# Patient Record
Sex: FEMALE | Race: WHITE | NOT HISPANIC OR LATINO | Employment: UNEMPLOYED | URBAN - METROPOLITAN AREA
[De-identification: names, ages, dates, MRNs, and addresses within clinical notes are randomized per-mention and may not be internally consistent; named-entity substitution may affect disease eponyms.]

---

## 2018-05-15 PROBLEM — C50.919 BREAST CA: Status: ACTIVE | Noted: 2018-05-15

## 2018-05-15 PROBLEM — E87.6 HYPOKALEMIA: Status: ACTIVE | Noted: 2018-05-15

## 2018-05-15 PROBLEM — R06.02 SHORTNESS OF BREATH: Status: ACTIVE | Noted: 2018-05-15

## 2018-05-15 PROBLEM — F41.9 ANXIETY: Status: ACTIVE | Noted: 2018-05-15

## 2018-05-15 PROBLEM — I26.99 PULMONARY EMBOLUS: Status: ACTIVE | Noted: 2018-05-15

## 2018-05-15 PROBLEM — J45.909 ASTHMA: Status: ACTIVE | Noted: 2018-05-15

## 2018-05-16 PROBLEM — E87.6 HYPOKALEMIA: Status: ACTIVE | Noted: 2018-05-16

## 2018-05-16 PROBLEM — C50.919 BREAST CA: Status: ACTIVE | Noted: 2018-05-16

## 2018-05-16 PROBLEM — J45.909 ASTHMA: Status: ACTIVE | Noted: 2018-05-16

## 2018-05-27 PROBLEM — F41.9 ANXIETY: Status: ACTIVE | Noted: 2018-05-27

## 2018-07-11 PROBLEM — L03.313 CELLULITIS OF CHEST WALL: Status: ACTIVE | Noted: 2018-07-11

## 2018-11-14 ENCOUNTER — HOSPITAL ENCOUNTER (INPATIENT)
Facility: HOSPITAL | Age: 43
LOS: 4 days | Discharge: HOME | DRG: 885 | End: 2018-11-18
Attending: PSYCHIATRY & NEUROLOGY | Admitting: PSYCHIATRY & NEUROLOGY
Payer: MEDICAID

## 2018-11-14 ENCOUNTER — HOSPITAL ENCOUNTER (EMERGENCY)
Facility: HOSPITAL | Age: 43
Discharge: PSYCHIATRIC OR SUBSTANCE USE FACILITY | End: 2018-11-14
Attending: SURGERY
Payer: MEDICAID

## 2018-11-14 VITALS
WEIGHT: 146 LBS | BODY MASS INDEX: 28.51 KG/M2 | SYSTOLIC BLOOD PRESSURE: 130 MMHG | TEMPERATURE: 97 F | RESPIRATION RATE: 17 BRPM | DIASTOLIC BLOOD PRESSURE: 90 MMHG | HEART RATE: 90 BPM | OXYGEN SATURATION: 100 %

## 2018-11-14 DIAGNOSIS — Z86.711 HISTORY OF PULMONARY EMBOLUS (PE): ICD-10-CM

## 2018-11-14 DIAGNOSIS — F41.8 DEPRESSION WITH ANXIETY: ICD-10-CM

## 2018-11-14 DIAGNOSIS — R45.851 DEPRESSION WITH SUICIDAL IDEATION: Primary | ICD-10-CM

## 2018-11-14 DIAGNOSIS — F43.10 PTSD (POST-TRAUMATIC STRESS DISORDER): Primary | ICD-10-CM

## 2018-11-14 DIAGNOSIS — F32.A DEPRESSION WITH SUICIDAL IDEATION: Primary | ICD-10-CM

## 2018-11-14 DIAGNOSIS — F33.2 SEVERE EPISODE OF RECURRENT MAJOR DEPRESSIVE DISORDER, WITHOUT PSYCHOTIC FEATURES: ICD-10-CM

## 2018-11-14 LAB
ALBUMIN SERPL BCP-MCNC: 4.5 G/DL
ALP SERPL-CCNC: 74 U/L
ALT SERPL W/O P-5'-P-CCNC: 25 U/L
AMPHET+METHAMPHET UR QL: NEGATIVE
ANION GAP SERPL CALC-SCNC: 8 MMOL/L
APAP SERPL-MCNC: <3 UG/ML
AST SERPL-CCNC: 23 U/L
B-HCG UR QL: NEGATIVE
BARBITURATES UR QL SCN>200 NG/ML: NEGATIVE
BASOPHILS # BLD AUTO: 0.04 K/UL
BASOPHILS NFR BLD: 1 %
BENZODIAZ UR QL SCN>200 NG/ML: NORMAL
BILIRUB SERPL-MCNC: 1.8 MG/DL
BILIRUB UR QL STRIP: NEGATIVE
BUN SERPL-MCNC: 9 MG/DL
BZE UR QL SCN: NEGATIVE
CALCIUM SERPL-MCNC: 9.7 MG/DL
CANNABINOIDS UR QL SCN: NEGATIVE
CHLORIDE SERPL-SCNC: 106 MMOL/L
CLARITY UR: CLEAR
CO2 SERPL-SCNC: 28 MMOL/L
COLOR UR: YELLOW
CREAT SERPL-MCNC: 0.7 MG/DL
CREAT UR-MCNC: 229.5 MG/DL
DIFFERENTIAL METHOD: NORMAL
EOSINOPHIL # BLD AUTO: 0 K/UL
EOSINOPHIL NFR BLD: 1 %
ERYTHROCYTE [DISTWIDTH] IN BLOOD BY AUTOMATED COUNT: 13.4 %
EST. GFR  (AFRICAN AMERICAN): >60 ML/MIN/1.73 M^2
EST. GFR  (NON AFRICAN AMERICAN): >60 ML/MIN/1.73 M^2
ETHANOL SERPL-MCNC: <10 MG/DL
GLUCOSE SERPL-MCNC: 94 MG/DL
GLUCOSE UR QL STRIP: NEGATIVE
HCT VFR BLD AUTO: 44.8 %
HGB BLD-MCNC: 15.3 G/DL
HGB UR QL STRIP: ABNORMAL
KETONES UR QL STRIP: NEGATIVE
LEUKOCYTE ESTERASE UR QL STRIP: NEGATIVE
LYMPHOCYTES # BLD AUTO: 1.1 K/UL
LYMPHOCYTES NFR BLD: 28 %
MCH RBC QN AUTO: 30.4 PG
MCHC RBC AUTO-ENTMCNC: 34.2 G/DL
MCV RBC AUTO: 89 FL
METHADONE UR QL SCN>300 NG/ML: NEGATIVE
MONOCYTES # BLD AUTO: 0.3 K/UL
MONOCYTES NFR BLD: 8.6 %
NEUTROPHILS # BLD AUTO: 2.4 K/UL
NEUTROPHILS NFR BLD: 61.4 %
NITRITE UR QL STRIP: NEGATIVE
OPIATES UR QL SCN: NEGATIVE
PCP UR QL SCN>25 NG/ML: NEGATIVE
PH UR STRIP: 6 [PH] (ref 5–8)
PLATELET # BLD AUTO: 295 K/UL
PMV BLD AUTO: 10.2 FL
POTASSIUM SERPL-SCNC: 3.3 MMOL/L
PROT SERPL-MCNC: 7.4 G/DL
PROT UR QL STRIP: NEGATIVE
RBC # BLD AUTO: 5.04 M/UL
SALICYLATES SERPL-MCNC: <5 MG/DL
SODIUM SERPL-SCNC: 142 MMOL/L
SP GR UR STRIP: 1.02 (ref 1–1.03)
T4 FREE SERPL-MCNC: 1.05 NG/DL
TOXICOLOGY INFORMATION: NORMAL
TSH SERPL DL<=0.005 MIU/L-ACNC: 1.67 UIU/ML
URN SPEC COLLECT METH UR: ABNORMAL
UROBILINOGEN UR STRIP-ACNC: NEGATIVE EU/DL
WBC # BLD AUTO: 3.97 K/UL

## 2018-11-14 PROCEDURE — 99285 EMERGENCY DEPT VISIT HI MDM: CPT

## 2018-11-14 PROCEDURE — 81025 URINE PREGNANCY TEST: CPT

## 2018-11-14 PROCEDURE — 80053 COMPREHEN METABOLIC PANEL: CPT

## 2018-11-14 PROCEDURE — 82746 ASSAY OF FOLIC ACID SERUM: CPT

## 2018-11-14 PROCEDURE — 25000003 PHARM REV CODE 250: Performed by: PSYCHIATRY & NEUROLOGY

## 2018-11-14 PROCEDURE — 80307 DRUG TEST PRSMV CHEM ANLYZR: CPT

## 2018-11-14 PROCEDURE — 11400000 HC PSYCH PRIVATE ROOM

## 2018-11-14 PROCEDURE — 83036 HEMOGLOBIN GLYCOSYLATED A1C: CPT

## 2018-11-14 PROCEDURE — 80320 DRUG SCREEN QUANTALCOHOLS: CPT

## 2018-11-14 PROCEDURE — 82607 VITAMIN B-12: CPT

## 2018-11-14 PROCEDURE — 82306 VITAMIN D 25 HYDROXY: CPT

## 2018-11-14 PROCEDURE — 80061 LIPID PANEL: CPT

## 2018-11-14 PROCEDURE — 84439 ASSAY OF FREE THYROXINE: CPT

## 2018-11-14 PROCEDURE — 84481 FREE ASSAY (FT-3): CPT

## 2018-11-14 PROCEDURE — 84443 ASSAY THYROID STIM HORMONE: CPT

## 2018-11-14 PROCEDURE — 81003 URINALYSIS AUTO W/O SCOPE: CPT | Mod: 59

## 2018-11-14 PROCEDURE — 80329 ANALGESICS NON-OPIOID 1 OR 2: CPT

## 2018-11-14 PROCEDURE — 85025 COMPLETE CBC W/AUTO DIFF WBC: CPT

## 2018-11-14 PROCEDURE — 36415 COLL VENOUS BLD VENIPUNCTURE: CPT

## 2018-11-14 PROCEDURE — 63600175 PHARM REV CODE 636 W HCPCS: Performed by: PSYCHIATRY & NEUROLOGY

## 2018-11-14 RX ORDER — ALBUTEROL SULFATE 90 UG/1
2 AEROSOL, METERED RESPIRATORY (INHALATION) EVERY 6 HOURS PRN
Status: DISCONTINUED | OUTPATIENT
Start: 2018-11-14 | End: 2018-11-18 | Stop reason: HOSPADM

## 2018-11-14 RX ORDER — IBUPROFEN 200 MG
1 TABLET ORAL
Status: DISCONTINUED | OUTPATIENT
Start: 2018-11-14 | End: 2018-11-14 | Stop reason: HOSPADM

## 2018-11-14 RX ORDER — OLANZAPINE 10 MG/2ML
10 INJECTION, POWDER, FOR SOLUTION INTRAMUSCULAR EVERY 4 HOURS PRN
Status: DISCONTINUED | OUTPATIENT
Start: 2018-11-14 | End: 2018-11-18 | Stop reason: HOSPADM

## 2018-11-14 RX ORDER — ENOXAPARIN SODIUM 100 MG/ML
100 INJECTION SUBCUTANEOUS NIGHTLY
Status: DISCONTINUED | OUTPATIENT
Start: 2018-11-14 | End: 2018-11-15

## 2018-11-14 RX ORDER — LOPERAMIDE HYDROCHLORIDE 2 MG/1
2 CAPSULE ORAL
Status: DISCONTINUED | OUTPATIENT
Start: 2018-11-14 | End: 2018-11-18 | Stop reason: HOSPADM

## 2018-11-14 RX ORDER — ENOXAPARIN SODIUM 100 MG/ML
70 INJECTION SUBCUTANEOUS EVERY 12 HOURS
Status: DISCONTINUED | OUTPATIENT
Start: 2018-11-14 | End: 2018-11-14

## 2018-11-14 RX ORDER — OLANZAPINE 10 MG/1
10 TABLET ORAL EVERY 4 HOURS PRN
Status: DISCONTINUED | OUTPATIENT
Start: 2018-11-14 | End: 2018-11-18 | Stop reason: HOSPADM

## 2018-11-14 RX ORDER — HALOPERIDOL 5 MG/ML
5 INJECTION INTRAMUSCULAR EVERY 4 HOURS PRN
Status: DISCONTINUED | OUTPATIENT
Start: 2018-11-14 | End: 2018-11-14 | Stop reason: HOSPADM

## 2018-11-14 RX ORDER — LORAZEPAM 2 MG/ML
2 INJECTION INTRAMUSCULAR EVERY 4 HOURS PRN
Status: DISCONTINUED | OUTPATIENT
Start: 2018-11-14 | End: 2018-11-14 | Stop reason: HOSPADM

## 2018-11-14 RX ORDER — ACETAMINOPHEN 325 MG/1
650 TABLET ORAL EVERY 6 HOURS PRN
Status: DISCONTINUED | OUTPATIENT
Start: 2018-11-14 | End: 2018-11-18 | Stop reason: HOSPADM

## 2018-11-14 RX ORDER — FOLIC ACID 1 MG/1
1 TABLET ORAL DAILY
Status: DISCONTINUED | OUTPATIENT
Start: 2018-11-15 | End: 2018-11-18 | Stop reason: HOSPADM

## 2018-11-14 RX ORDER — DIPHENHYDRAMINE HYDROCHLORIDE 50 MG/ML
50 INJECTION INTRAMUSCULAR; INTRAVENOUS EVERY 4 HOURS PRN
Status: DISCONTINUED | OUTPATIENT
Start: 2018-11-14 | End: 2018-11-14 | Stop reason: HOSPADM

## 2018-11-14 RX ORDER — CLONAZEPAM 1 MG/1
1 TABLET ORAL 2 TIMES DAILY
Status: DISCONTINUED | OUTPATIENT
Start: 2018-11-14 | End: 2018-11-16

## 2018-11-14 RX ORDER — MAG HYDROX/ALUMINUM HYD/SIMETH 200-200-20
30 SUSPENSION, ORAL (FINAL DOSE FORM) ORAL EVERY 6 HOURS PRN
Status: DISCONTINUED | OUTPATIENT
Start: 2018-11-14 | End: 2018-11-18 | Stop reason: HOSPADM

## 2018-11-14 RX ORDER — DOCUSATE SODIUM 100 MG/1
100 CAPSULE, LIQUID FILLED ORAL DAILY PRN
Status: DISCONTINUED | OUTPATIENT
Start: 2018-11-14 | End: 2018-11-18 | Stop reason: HOSPADM

## 2018-11-14 RX ORDER — HYDROXYZINE PAMOATE 50 MG/1
50 CAPSULE ORAL EVERY 6 HOURS PRN
Status: DISCONTINUED | OUTPATIENT
Start: 2018-11-14 | End: 2018-11-18 | Stop reason: HOSPADM

## 2018-11-14 RX ADMIN — ENOXAPARIN SODIUM 100 MG: 100 INJECTION, SOLUTION INTRAVENOUS; SUBCUTANEOUS at 08:11

## 2018-11-14 RX ADMIN — CLONAZEPAM 1 MG: 1 TABLET ORAL at 08:11

## 2018-11-14 RX ADMIN — HYDROXYZINE PAMOATE 50 MG: 50 CAPSULE ORAL at 11:11

## 2018-11-14 NOTE — ED PROVIDER NOTES
[facility]                                                 Chief Complaint  43 y.o. female with Depression    History of Present Illness  Val Wing presents to the emergency room with depression issues today  Patient was sent from a local health clinic for admission to the [facility] this p.m.  Patient has significant PTSD issues with significant anxiety and depression today  Patient is not overtly suicidal but incredibly depressed, safety issues are concern  Patient is not psychotic, not hallucinating, she denies any drug addiction on interview    The history is provided by the patient   device was not used during this ER visit  Medical history: Anxiety, abnormal Pap smear, stage III breast cancer, neuropathy  Surgeries: Breast surgery, , fallopian tube, lymphadenectomy, lysis of adhesions, mastectomy, port placement  No Known Allergies     Review of Systems and Physical Exam      Review of Systems  -- Constitution - no fever, denies fatigue, no weakness, no chills  -- Eyes - no tearing or redness, no visual disturbance  -- Ear, Nose - no tinnitus or earache, no nasal congestion or discharge  -- Mouth,Throat - no sore throat, no toothache, normal voice, normal swallowing  -- Respiratory - denies cough and congestion, no shortness of breath, no COLUNGA  -- Cardiovascular - denies chest pain, no palpitations, denies claudication  -- Gastrointestinal - denies abdominal pain, nausea, vomiting, or diarrhea  -- Genitourinary - no dysuria, denies flank pain, no hematuria, no STD risk  -- Musculoskeletal - denies back pain, negative for myalgias and arthralgias   -- Neurological - no headache, denies weakness or seizure; no LOC  -- Skin - denies pallor, rash, or changes in skin. no hives or welts noted  -- Psychiatric - PTSD and depression, no psychosis or fractured thought noted     Physical Exam  -- Nursing note and vitals reviewed  -- Constitutional: Appears  well-developed and well-nourished  -- Head: Atraumatic. Normocephalic. No obvious abnormality  -- Eyes: Pupils are equal and reactive to light. Normal conjunctiva and lids  -- Cardiac: Normal rate, regular rhythm and normal heart sounds  -- Pulmonary: Normal respiratory effort, breath sounds clear to auscultation  -- Abdominal: Soft, no tenderness. Normal bowel sounds. Normal liver edge  -- Musculoskeletal: Normal range of motion, no effusions. Joints stable   -- Neurological: No focal deficits. Showed good interaction with staff  -- Vascular: Posterior tibial, dorsalis pedis and radial pulses 2+ bilaterally    -- Lymphatics: No cervical or peripheral lymphadenopathy. No edema noted  -- Skin: Warm and dry. No evidence of rash or cellulitis    Emergency Room Course      Diagnosis  -- The primary encounter diagnosis was PTSD (post-traumatic stress disorder).   -- A diagnosis of Depression with anxiety was also pertinent to this visit.    Disposition and Plan  -- Disposition: PEC  -- Condition: stable  -- Pt will be placed in a psychiatric facility  -- The patient is a direct observation until placement  -- The patient has been made aware of his or her rights while under PEC in the ER  -- All questions have been answered; will follow ER protocols until placement    Lab work was performed in the ER, this patient is cleared for psychiatric placement     This note is dictated on Dragon Natural Speaking word recognition program.  There are word recognition mistakes that are occasionally missed on review.          Shahid Gordon MD  11/14/18 [id number]

## 2018-11-15 PROBLEM — F33.2 SEVERE EPISODE OF RECURRENT MAJOR DEPRESSIVE DISORDER, WITHOUT PSYCHOTIC FEATURES: Status: ACTIVE | Noted: 2018-11-15

## 2018-11-15 PROBLEM — F43.10 PTSD (POST-TRAUMATIC STRESS DISORDER): Status: ACTIVE | Noted: 2018-11-15

## 2018-11-15 LAB
25(OH)D3+25(OH)D2 SERPL-MCNC: 34 NG/ML
CHOLEST SERPL-MCNC: 172 MG/DL
CHOLEST/HDLC SERPL: 2.6 {RATIO}
ESTIMATED AVG GLUCOSE: 80 MG/DL
FOLATE SERPL-MCNC: 13.7 NG/ML
HBA1C MFR BLD HPLC: 4.4 %
HDLC SERPL-MCNC: 67 MG/DL
HDLC SERPL: 39 %
LDLC SERPL CALC-MCNC: 85.8 MG/DL
NONHDLC SERPL-MCNC: 105 MG/DL
T3FREE SERPL-MCNC: 2.9 PG/ML
TRIGL SERPL-MCNC: 96 MG/DL
VIT B12 SERPL-MCNC: 581 PG/ML

## 2018-11-15 PROCEDURE — 11400000 HC PSYCH PRIVATE ROOM

## 2018-11-15 PROCEDURE — 99223 1ST HOSP IP/OBS HIGH 75: CPT | Mod: ,,, | Performed by: PSYCHIATRY & NEUROLOGY

## 2018-11-15 PROCEDURE — 25000003 PHARM REV CODE 250: Performed by: PSYCHIATRY & NEUROLOGY

## 2018-11-15 PROCEDURE — 36415 COLL VENOUS BLD VENIPUNCTURE: CPT

## 2018-11-15 PROCEDURE — 90833 PSYTX W PT W E/M 30 MIN: CPT | Mod: ,,, | Performed by: PSYCHIATRY & NEUROLOGY

## 2018-11-15 PROCEDURE — 99232 SBSQ HOSP IP/OBS MODERATE 35: CPT | Mod: ,,, | Performed by: NURSE PRACTITIONER

## 2018-11-15 PROCEDURE — 63600175 PHARM REV CODE 636 W HCPCS: Performed by: NURSE PRACTITIONER

## 2018-11-15 PROCEDURE — 97802 MEDICAL NUTRITION INDIV IN: CPT

## 2018-11-15 RX ORDER — PAROXETINE 10 MG/1
10 TABLET, FILM COATED ORAL DAILY
Status: DISCONTINUED | OUTPATIENT
Start: 2018-11-15 | End: 2018-11-16

## 2018-11-15 RX ORDER — ENOXAPARIN SODIUM 100 MG/ML
40 INJECTION SUBCUTANEOUS NIGHTLY
Status: DISCONTINUED | OUTPATIENT
Start: 2018-11-15 | End: 2018-11-18 | Stop reason: HOSPADM

## 2018-11-15 RX ORDER — MIRTAZAPINE 15 MG/1
15 TABLET, ORALLY DISINTEGRATING ORAL NIGHTLY
Status: DISCONTINUED | OUTPATIENT
Start: 2018-11-15 | End: 2018-11-18 | Stop reason: HOSPADM

## 2018-11-15 RX ORDER — PAROXETINE 10 MG/1
10 TABLET, FILM COATED ORAL DAILY
Status: DISCONTINUED | OUTPATIENT
Start: 2018-11-15 | End: 2018-11-15

## 2018-11-15 RX ADMIN — CLONAZEPAM 1 MG: 1 TABLET ORAL at 08:11

## 2018-11-15 RX ADMIN — FOLIC ACID 1 MG: 1 TABLET ORAL at 08:11

## 2018-11-15 RX ADMIN — MIRTAZAPINE 15 MG: 15 TABLET, ORALLY DISINTEGRATING ORAL at 08:11

## 2018-11-15 RX ADMIN — PAROXETINE 10 MG: 10 TABLET, FILM COATED ORAL at 12:11

## 2018-11-15 RX ADMIN — THERA TABS 1 TABLET: TAB at 08:11

## 2018-11-15 RX ADMIN — ENOXAPARIN SODIUM 40 MG: 100 INJECTION SUBCUTANEOUS at 08:11
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
## 2018-11-15 NOTE — PROGRESS NOTES
" [facility]  Adult Nutrition  Progress Note    SUMMARY       Recommendations    Recommendation/Intervention: (P) Recommend pt to continue and tolerate regular diet   Goals: (P) Pt to increase PO intake to 50% while in the hospital   Nutrition Goal Status: (P) new    Reason for Assessment    Reason for Assessment: (P) consult  Diagnosis: (P) (Depression with suicidal ideation )  Relevant Medical History: (P) Breast cancer stage III hx  General Information Comments: (P) NFPE not completed; not appropriate due to pt psych status   Nutrition Discharge Planning: (P) Pt to continue on regular diet and take multivitamin at home.     Nutrition Risk Screen    Nutrition Risk Screen: no indicators present    Nutrition/Diet History    Patient Reported Diet/Restrictions/Preferences: (P) general  Typical Food/Fluid Intake: (P) 25% breakfast  Do you have any cultural, spiritual, Latter day conflicts, given your current situation?: none  Food Allergies: (P) NKFA  Factors Affecting Nutritional Intake: (P) None identified at this time    Anthropometrics    Temp: 96.6 °F (35.9 °C)  Height Method: Measured  Height: (P) 5' 1" (154.9 cm)  Height (inches): (P) 61 in  Weight Method: Standard Scale  Weight: (P) 65.8 kg (145 lb 1 oz)  Weight (lb): (P) 145.06 lb  Ideal Body Weight (IBW), Female: (P) 105 lb  % Ideal Body Weight, Female (lb): (P) 138.15 lb  BMI (Calculated): (P) 27.5  BMI Grade: (P) 25 - 29.9 - overweight       Lab/Procedures/Meds    Pertinent Labs Reviewed: (P) reviewed  Pertinent Labs Comments: (P) Potassium 3.3  Pertinent Medications Reviewed: (P) reviewed  Pertinent Medications Comments: (P) Multivitamin, Folic acid     Physical Findings/Assessment    Overall Physical Appearance: (P) nourished  Oral/Mouth Cavity: (P) no grinding or difficulty chewing  Skin: (P) intact    Estimated/Assessed Needs    Weight Used For Calorie Calculations: (P) 65.8 kg (145 lb 1 oz)  Energy Calorie Requirements (kcal): (P) " 1506(1.2 AF)  Energy Need Method: (P) Shelly Church  Protein Requirements: (P) 52-65.94(0.8-1gm protein)  Weight Used For Protein Calculations: (P) 65.8 kg (145 lb 1 oz)  Fluid Requirements (mL): (P) 1506  Fluid Need Method: (P) RDA Method  RDA Method (mL): (P) 1506         Nutrition Prescription Ordered    Current Diet Order: (P) Regular    Evaluation of Received Nutrient/Fluid Intake    % Kcal Needs: (P) 25%  % Protein Needs: (P) 25%  Tolerance: (P) tolerating  % Intake of Estimated Energy Needs: 25 - 50 %  % Meal Intake: 25 - 50 %    Nutrition Risk    Level of Risk/Frequency of Follow-up: (P) low     Assessment and Plan  Nutrition Problem  Inadequate oral intake     Related to (etiology):   Pt not eating all breakfast     Signs and Symptoms (as evidenced by):   Pt PO intake at 25%    Interventions  General/healthful diet  Multivitamin/mineral supplementation therpay    Recommendations (treatment strategy):  Recommend pt to continue and tolerate regular diet     Nutrition Diagnosis Status:   New       Monitor and Evaluation    Food and Nutrient Intake: (P) food and beverage intake  Food and Nutrient Adminstration: (P) diet order  Knowledge/Beliefs/Attitudes: (P) beliefs and attitudes, food and nutrition knowledge/skill  Physical Activity and Function: (P) nutrition-related ADLs and IADLs  Anthropometric Measurements: (P) body mass index, weight change, weight, height/length  Biochemical Data, Medical Tests and Procedures: (P) lipid profile, inflammatory profile, gastrointestinal profile, glucose/endocrine profile, electrolyte and renal panel  Nutrition-Focused Physical Findings: (P) overall appearance     Nutrition Follow-Up    RD Follow-up?: (P) Yes

## 2018-11-15 NOTE — PLAN OF CARE
Problem: Patient Care Overview (Adult)  Goal: Plan of Care Review  Outcome: Ongoing (interventions implemented as appropriate)  Pt had trouble falling asleep. PRN Vistaril given and was noted to be lying quiet in bed, eyes closed, respiration even and unlabored, appearing asleep by 0100.  Slept well for remainder of shift.  Safety and precautions maintained with rounds every 15 minutes, bed is fixed in a low position and pathways kept clear.  No fall occurred.

## 2018-11-15 NOTE — CONSULTS
[facility] Medicine  Consult Note    Patient Name: Val Wing  MRN: [id number]  Admission Date: 2018  Hospital Length of Stay: 1 days  Attending Physician: Junior Aiken MD   Primary Care Provider: Milton Zuleta MD           Patient information was obtained from patient and ER records.     Inpatient consult to [facility] for History and Physical  Consult performed by: Shania Styles NP  Consult ordered by: Junior Aiken MD        Subjective:     Principal Problem: <principal problem not specified>    Chief Complaint: No chief complaint on file.       HPI: 42 yo female patient with hx of depression presented to ER with severe depression yesterday. Admitted to U. Medicine consulted for H/P.     Does a recent hx of PE. Was on lovenox. She recently had CT showing resolution of PE. She reports that her pulm has placed her on lovenox 100mg daily. She reports that that was her first clot and is no longer being treated for breast ca. She is unsure why she is still on lovenox at all much less inj vs pills for blood thinner.     Past Medical History:   Diagnosis Date    Abnormal Pap smear of cervix     ASCUS +HPV    Anxiety     Breast CA 2017    stage 3    Depression     Hx of psychiatric care     Neuropathy     Psychiatric problem        Past Surgical History:   Procedure Laterality Date    BREAST SURGERY       SECTION      fallopian tube removal      infusaport      LYMPHADENECTOMY Left     LYSIS-ADHESION N/A 2015    Performed by [facility] at [facility]    MASTECTOMY Left     REPEAT DELIVERY-CEASAREAN SECTION N/A 2015    Performed by [facility] at [facility]       Review of patient's allergies indicates:  No Known Allergies    Current Facility-Administered Medications on File Prior to Encounter   Medication    [DISCONTINUED] diphenhydrAMINE injection 50 mg    [DISCONTINUED] haloperidol lactate  injection 5 mg    [DISCONTINUED] lorazepam injection 2 mg    [DISCONTINUED] nicotine 21 mg/24 hr 1 patch     Current Outpatient Medications on File Prior to Encounter   Medication Sig    enoxaparin (LOVENOX) 80 mg/0.8 mL Syrg Inject 0.7 mLs (70 mg total) into the skin every 12 (twelve) hours.    albuterol 90 mcg/actuation inhaler Inhale 2 puffs into the lungs every 6 (six) hours as needed for Wheezing (COUGH).     Family History     Problem Relation (Age of Onset)    Anxiety disorder Mother        Tobacco Use    Smoking status: Never Smoker    Smokeless tobacco: Never Used   Substance and Sexual Activity    Alcohol use: No    Drug use: No    Sexual activity: Yes     Partners: Male     Birth control/protection: None     Comment:      Review of Systems   Constitutional: Negative for chills, fatigue, fever and unexpected weight change.   HENT: Negative for congestion, ear pain, sore throat and trouble swallowing.    Eyes: Negative for pain and visual disturbance.   Respiratory: Negative for cough, chest tightness and shortness of breath.    Cardiovascular: Negative for chest pain, palpitations and leg swelling.   Gastrointestinal: Negative for abdominal distention, abdominal pain, constipation, diarrhea and vomiting.   Genitourinary: Negative for difficulty urinating, dysuria, flank pain, frequency and hematuria.   Musculoskeletal: Negative for back pain, gait problem, joint swelling, neck pain and neck stiffness.   Skin: Negative for rash and wound.   Neurological: Negative for dizziness, seizures, speech difficulty, light-headedness and headaches.     Objective:     Vital Signs (Most Recent):  Temp: 96.6 °F (35.9 °C) (11/15/18 0802)  Pulse: (!) 53 (11/15/18 0802)  Resp: 16 (11/15/18 0802)  BP: 109/61 (11/15/18 0802) Vital Signs (24h Range):  Temp:  [96.3 °F (35.7 °C)-97 °F (36.1 °C)] 96.6 °F (35.9 °C)  Pulse:  [53-94] 53  Resp:  [16-20] 16  SpO2:  [99 %-100 %] 100 %  BP: (109-131)/(61-90) 109/61      Weight: 65.8 kg (145 lb 1 oz)  Body mass index is 27.41 kg/m².    Physical Exam   Constitutional: She is oriented to person, place, and time. She appears well-developed and well-nourished.   HENT:   Head: Normocephalic and atraumatic.   Neck: No thyromegaly present.   Cardiovascular: Normal rate, regular rhythm, normal heart sounds and intact distal pulses.   No murmur heard.  Pulmonary/Chest: Effort normal and breath sounds normal. No respiratory distress. She has no wheezes. She has no rales.   Abdominal: Soft. Bowel sounds are normal. She exhibits no distension and no mass. There is no tenderness.   Musculoskeletal: Normal range of motion. She exhibits no edema.   Lymphadenopathy:     She has no cervical adenopathy.   Neurological: She is alert and oriented to person, place, and time.     Neuro: Cranial nerves:  CN II Visual fields full to confrontation.   CN III, IV, VI Pupils are equal, round, and reactive to light.  CN III: no palsy  Nystagmus: none   Diplopia: none  Ophthalmoparesis: none  CN V Facial sensation intact.   CN VII Facial expression full, symmetric.   CN VIII normal.   CN IX normal.   CN X normal.   CN XI normal.   CN XII normal.         Skin: Skin is warm and dry. No erythema.   Vitals reviewed.      Significant Labs:  UPT  Results for orders placed or performed during the hospital encounter of 05/09/17   POCT urine pregnancy   Result Value Ref Range    POC Preg Test, Ur Negative Negative     Acceptable Yes      U/A  Results for orders placed or performed during the hospital encounter of 05/09/17   Urinalysis   Result Value Ref Range    Specimen UA Urine, Clean Catch     Color, UA Yellow Yellow, Straw, Huong    Appearance, UA Hazy (A) Clear    pH, UA 8.0 5.0 - 8.0    Specific Gravity, UA 1.005 1.005 - 1.030    Protein, UA Negative Negative    Glucose, UA Negative Negative    Ketones, UA Negative Negative    Bilirubin (UA) Negative Negative    Occult Blood UA 1+ (A) Negative     Nitrite, UA Negative Negative    Urobilinogen, UA Negative <2.0 EU/dL    Leukocytes, UA Negative Negative     UDS  Results for orders placed or performed during the hospital encounter of 11/14/18   Drug screen panel, emergency   Result Value Ref Range    Benzodiazepines Presumptive Positive     Methadone metabolites Negative     Cocaine (Metab.) Negative     Opiate Scrn, Ur Negative     Barbiturate Screen, Ur Negative     Amphetamine Screen, Ur Negative     THC Negative     Phencyclidine Negative     Creatinine, Random Ur 229.5 15.0 - 325.0 mg/dL    Toxicology Information SEE COMMENT      CBC  Results for orders placed or performed during the hospital encounter of 11/14/18   CBC auto differential   Result Value Ref Range    WBC 3.97 3.90 - 12.70 K/uL    RBC 5.04 4.00 - 5.40 M/uL    Hemoglobin 15.3 12.0 - 16.0 g/dL    Hematocrit 44.8 37.0 - 48.5 %    MCV 89 82 - 98 fL    MCH 30.4 27.0 - 31.0 pg    MCHC 34.2 32.0 - 36.0 g/dL    RDW 13.4 11.5 - 14.5 %    Platelets 295 150 - 350 K/uL    MPV 10.2 9.2 - 12.9 fL    Gran # (ANC) 2.4 1.8 - 7.7 K/uL    Lymph # 1.1 1.0 - 4.8 K/uL    Mono # 0.3 0.3 - 1.0 K/uL    Eos # 0.0 0.0 - 0.5 K/uL    Baso # 0.04 0.00 - 0.20 K/uL    Gran% 61.4 38.0 - 73.0 %    Lymph% 28.0 18.0 - 48.0 %    Mono% 8.6 4.0 - 15.0 %    Eosinophil% 1.0 0.0 - 8.0 %    Basophil% 1.0 0.0 - 1.9 %    Differential Method Automated      CMP  Results for orders placed or performed during the hospital encounter of 11/14/18   Comprehensive metabolic panel   Result Value Ref Range    Sodium 142 136 - 145 mmol/L    Potassium 3.3 (L) 3.5 - 5.1 mmol/L    Chloride 106 95 - 110 mmol/L    CO2 28 23 - 29 mmol/L    Glucose 94 70 - 110 mg/dL    BUN, Bld 9 6 - 20 mg/dL    Creatinine 0.7 0.5 - 1.4 mg/dL    Calcium 9.7 8.7 - 10.5 mg/dL    Total Protein 7.4 6.0 - 8.4 g/dL    Albumin 4.5 3.5 - 5.2 g/dL    Total Bilirubin 1.8 (H) 0.1 - 1.0 mg/dL    Alkaline Phosphatase 74 55 - 135 U/L    AST 23 10 - 40 U/L    ALT 25 10 - 44 U/L    Anion  Gap 8 8 - 16 mmol/L    eGFR if African American >60 >60 mL/min/1.73 m^2    eGFR if non African American >60 >60 mL/min/1.73 m^2     TSH  Results for orders placed or performed during the hospital encounter of 11/14/18   TSH   Result Value Ref Range    TSH 1.665 0.400 - 4.000 uIU/mL     ETOH  Results for orders placed or performed during the hospital encounter of 11/14/18   Ethanol   Result Value Ref Range    Alcohol, Medical, Serum <10 <10 mg/dL     Salicylate  Results for orders placed or performed during the hospital encounter of 11/14/18   Salicylate level   Result Value Ref Range    Salicylate Lvl <5.0 (L) 15.0 - 30.0 mg/dL     Acetaminophen  Results for orders placed or performed during the hospital encounter of 11/14/18   Acetaminophen level   Result Value Ref Range    Acetaminophen (Tylenol), Serum <3.0 (L) 10.0 - 20.0 ug/mL       CTA 8/9/17 No CT findings for pulmonary embolism with the emboli seen in the right lung base on the previous study no longer visualized.  Some fibrosis in the left upper lung       Assessment/Plan:     Depression with suicidal ideation    Further orders per psych       Pulmonary embolus    5/15 noted on CTA. Was anticoagulated since then, repeat CTA last month shows resolution, should no longer need OPE treatment. Not sure how she was put on 100mg dose. Even 1mg /kg should be 60 BID. Will reduce to dvt prophylaxis and f/u hematology once d/c to eval if she should cont long term anticoag and if she needs to then can she be transitioned to pill. .          VTE Risk Mitigation (From admission, onward)        Ordered     enoxaparin injection 40 mg  Nightly      11/14/18 1942              Thank you for your consult. I will sign off. Please contact us if you have any additional questions.    Shania Styles NP  Department of Hospital Medicine   [facility]

## 2018-11-15 NOTE — PLAN OF CARE
Problem: Patient Care Overview (Adult)  Goal: Plan of Care Review  Outcome: Ongoing (interventions implemented as appropriate)  Plan of care reviewed.  Denies intent to harm self or others at this time.  Accepts all meals and medications.  Gait steady, no falls.  Pleasant, interacts with staff and peers. Seems a little anxious but is willing to follow the recommendation made by MD and staff. Promoted an individualized safety plan, reality-based interactions, effective coping strategies, and impulse control.  Will continue to monitor for safety.         Problem: Mood Impairment (Depressive Signs/Symptoms) (Adult)  Goal: Improved Mood Symptoms  Outcome: Ongoing (interventions implemented as appropriate)  Accepts med as ordered by MD.

## 2018-11-15 NOTE — PROGRESS NOTES
"   11/15/18 1614   Assessment   Patient's Identification of the Problem Patient presents tearful affect and "feeling tired, legs numb" mood. Patient states her admit is due to anxiety, depression and suicidal thoughts. Patient states she does not want to be on xanax anymore because it's not helping. Patient reports having a history of breast cancer 2017, finiished chemo May 2018, currently in remission.  October 2017 while going through chemotherapy. Patient denies alcohol or drug use. Patient reports she is  twice, have 2 girls ages 2 & 17, some college/currently attending college(medical billing and coding), certified as a physical therapy tech, unemployed(lives off child support and savings), wears glasses, is a [employer], lives in [facility] [place] [facility] with 2 year old daughter(owns her mobile home). Patient verbalized main goal "get off xanax, be on something to manage my anxiety and depression, nothing narcotic."    Leisure Interest Watching TV;Sit Outside;Movies;Enjoy Family/Friends;Mu-ism;Other (See Comments)  (I don't do nothing now, been a few months)   Leisure Barriers Too Busy;Endurance;In Pain;Attitude;Loss of Interest;Lack of Finances;Energy Level;Self Confidence;Fears/Phobias;Other (See Comments)  (fear having a panic attack,get mad easily)   Treatment Focus To Improve Mood;To Increase Motivation;Increase Self Confidence;To Improve Leisure Awareness/Lifestyle/Interest;To Promote Successful and Safe Self Expression;To Improve Coping Skills;To Increase Energy Level     Treatment Recommendation: To address problem(s) #  1:1 Intervention (as needed)    Cognitive Stimulation Skilled Activity  Creative Expression Skilled Activity  Mild Exercises Skilled Activity  Stress Management Skilled Activity  Coping Skilled Activity  Leisure Education and Awareness Skilled Activity    Treatment Goal(s):  Long Term Goals Refer To Master Treatment Plan    Short Term Treatment Goal(s)  Patient Will:  Exhibit " Improvement in Mood  Demonstrate Constructive Expression of Feelings and Behavior  Identify at Least 2 Coping Skills or Leisure Skills to Reduce Depression and Hopelessness Upon Request from Therapist    Discharge Recommendations:  Encourage Patient to Actively Utilize Available Community Resources to Increase Leisure Involvement to Decrease Signs and Symptoms of Illness  Encourage Patient to Utilize Coping Skills on a Regular Basis to Reduce the Risk of Decompensating and Re-Hospitalizations  Follow Up with After Care Appointments  Continue with Current Leisure Activities

## 2018-11-15 NOTE — PLAN OF CARE
Problem: Patient Care Overview (Adult)  Goal: Plan of Care Review  Outcome: Ongoing (interventions implemented as appropriate)  Goals: Pt to increase PO intake to 50% while in the hospital   Nutrition Goal Status: new    Nutrition Discharge Planning: Pt to continue on regular diet and take multivitamin at home.

## 2018-11-15 NOTE — PSYCH
Pt accepted for admission by Dr Aiken.Pt arrived to unit at 1828.Prior to entry on unit pt scanned per security wand.Upon entry on unit pt received a body assessment.Pr admitted for depression and anxiety.Pt went to [facility] today and was referred here.Pt depression and anxiety has become worse recently.Pt unable to function.Unable to hold a job and had to drop her on line courses.Pt with poor appetite and sleep.Pt has been taking extra Xanax at night for sleep.Pt has been having suicidal thoughts but no plans.Pt very depressed and anxious.Pt given unit tour and educated on program and rules.Pt cooperative.

## 2018-11-15 NOTE — PLAN OF CARE
"Problem: Overarching Goals (Adult)  Goal: Develops/Participates in Therapeutic [place] to Support Successful Transition    Intervention: [facility] The[facility] [place]  1:1 with pt:    Individual psycho-educational meeting initiated with patient in lieu of group. Discussed importance of attending counseling to address/process past traumas and mental health needs. Patient provided with [facility] contact information and educated on additional professional resources such as [place] Childcare Assistance Program. Patient expressed she had to "quit her job" due to lack of child chare for her 1 y/o dtr. . Patient verbalized she would complete the online application once she returns home, and it would greatly befit her if she qualified for such services, because she would be able to return to work. Patient continues to be very motivated and expressed her intent to follow through with the above recommendations.  She was encouraged to attend future therapeutic activities .         "

## 2018-11-15 NOTE — HPI
42 yo female patient with hx of depression presented to ER with severe depression yesterday. Admitted to U. Medicine consulted for H/P.     Does a recent hx of PE. Was on lovenox. She recently had CT showing resolution of PE

## 2018-11-15 NOTE — ASSESSMENT & PLAN NOTE
5/15 noted on CTA. Was anticoagulated since then, repeat CTA last month shows resolution, no longer on lovenox.

## 2018-11-15 NOTE — PLAN OF CARE
Problem: Patient Care Overview (Adult)  Goal: Plan of Care Review  Outcome: Ongoing (interventions implemented as appropriate)  Pt is cooperative and pleasant, somewhat anxious and reports depression.  Pt denies any active S/I, reports some passive thoughts at times with no plan or intent.  Pt has a long history of depression and anxiety with panic attacks.  Pt verbally contracts for safety.  Encouraged pt to take all meds as ordered and report any issues to staff.  Pt verbalized understanding, will continue to monitor mood and behavior.

## 2018-11-15 NOTE — SUBJECTIVE & OBJECTIVE
Past Medical History:   Diagnosis Date    Abnormal Pap smear of cervix     ASCUS +HPV    Anxiety     Breast CA 2017    stage 3    Depression     Hx of psychiatric care     Neuropathy     Psychiatric problem        Past Surgical History:   Procedure Laterality Date    BREAST SURGERY       SECTION      fallopian tube removal      infusaport      LYMPHADENECTOMY Left     LYSIS-ADHESION N/A 2015    [facility]    MASTECTOMY Left     REPEAT DELIVERY-CEASAREAN SECTION N/A 2015    Performed by [facility] at [facility]       Review of patient's allergies indicates:  No Known Allergies    Current Facility-Administered Medications on File Prior to Encounter   Medication    [DISCONTINUED] diphenhydrAMINE injection 50 mg    [DISCONTINUED] haloperidol lactate injection 5 mg    [DISCONTINUED] lorazepam injection 2 mg    [DISCONTINUED] nicotine 21 mg/24 hr 1 patch     Current Outpatient Medications on File Prior to Encounter   Medication Sig    enoxaparin (LOVENOX) 80 mg/0.8 mL Syrg Inject 0.7 mLs (70 mg total) into the skin every 12 (twelve) hours.    albuterol 90 mcg/actuation inhaler Inhale 2 puffs into the lungs every 6 (six) hours as needed for Wheezing (COUGH).     Family History     Problem Relation (Age of Onset)    Anxiety disorder Mother        Tobacco Use    Smoking status: Never Smoker    Smokeless tobacco: Never Used   Substance and Sexual Activity    Alcohol use: No    Drug use: No    Sexual activity: Yes     Partners: Male     Birth control/protection: None     Comment:      Review of Systems   Constitutional: Negative for chills, fatigue, fever and unexpected weight change.   HENT: Negative for congestion, ear pain, sore throat and trouble swallowing.    Eyes: Negative for pain and visual disturbance.   Respiratory: Negative for cough, chest tightness and shortness of breath.    Cardiovascular: Negative for chest  pain, palpitations and leg swelling.   Gastrointestinal: Negative for abdominal distention, abdominal pain, constipation, diarrhea and vomiting.   Genitourinary: Negative for difficulty urinating, dysuria, flank pain, frequency and hematuria.   Musculoskeletal: Negative for back pain, gait problem, joint swelling, neck pain and neck stiffness.   Skin: Negative for rash and wound.   Neurological: Negative for dizziness, seizures, speech difficulty, light-headedness and headaches.     Objective:     Vital Signs (Most Recent):  Temp: 96.6 °F (35.9 °C) (11/15/18 0802)  Pulse: (!) 53 (11/15/18 0802)  Resp: 16 (11/15/18 0802)  BP: 109/61 (11/15/18 0802) Vital Signs (24h Range):  Temp:  [96.3 °F (35.7 °C)-97 °F (36.1 °C)] 96.6 °F (35.9 °C)  Pulse:  [53-94] 53  Resp:  [16-20] 16  SpO2:  [99 %-100 %] 100 %  BP: (109-131)/(61-90) 109/61     Weight: 65.8 kg (145 lb 1 oz)  Body mass index is 27.41 kg/m².    Physical Exam   Constitutional: She is oriented to person, place, and time. She appears well-developed and well-nourished.   HENT:   Head: Normocephalic and atraumatic.   Neck: No thyromegaly present.   Cardiovascular: Normal rate, regular rhythm, normal heart sounds and intact distal pulses.   No murmur heard.  Pulmonary/Chest: Effort normal and breath sounds normal. No respiratory distress. She has no wheezes. She has no rales.   Abdominal: Soft. Bowel sounds are normal. She exhibits no distension and no mass. There is no tenderness.   Musculoskeletal: Normal range of motion. She exhibits no edema.   Lymphadenopathy:     She has no cervical adenopathy.   Neurological: She is alert and oriented to person, place, and time.     Neuro: Cranial nerves:  CN II Visual fields full to confrontation.   CN III, IV, VI Pupils are equal, round, and reactive to light.  CN III: no palsy  Nystagmus: none   Diplopia: none  Ophthalmoparesis: none  CN V Facial sensation intact.   CN VII Facial expression full, symmetric.   CN VIII normal.    CN IX normal.   CN X normal.   CN XI normal.   CN XII normal.         Skin: Skin is warm and dry. No erythema.   Vitals reviewed.      Significant Labs:  UPT  Results for orders placed or performed during the hospital encounter of 05/09/17   POCT urine pregnancy   Result Value Ref Range    POC Preg Test, Ur Negative Negative     Acceptable Yes      U/A  Results for orders placed or performed during the hospital encounter of 05/09/17   Urinalysis   Result Value Ref Range    Specimen UA Urine, Clean Catch     Color, UA Yellow Yellow, Straw, Huong    Appearance, UA Hazy (A) Clear    pH, UA 8.0 5.0 - 8.0    Specific Gravity, UA 1.005 1.005 - 1.030    Protein, UA Negative Negative    Glucose, UA Negative Negative    Ketones, UA Negative Negative    Bilirubin (UA) Negative Negative    Occult Blood UA 1+ (A) Negative    Nitrite, UA Negative Negative    Urobilinogen, UA Negative <2.0 EU/dL    Leukocytes, UA Negative Negative     UDS  Results for orders placed or performed during the hospital encounter of 11/14/18   Drug screen panel, emergency   Result Value Ref Range    Benzodiazepines Presumptive Positive     Methadone metabolites Negative     Cocaine (Metab.) Negative     Opiate Scrn, Ur Negative     Barbiturate Screen, Ur Negative     Amphetamine Screen, Ur Negative     THC Negative     Phencyclidine Negative     Creatinine, Random Ur 229.5 15.0 - 325.0 mg/dL    Toxicology Information SEE COMMENT      CBC  Results for orders placed or performed during the hospital encounter of 11/14/18   CBC auto differential   Result Value Ref Range    WBC 3.97 3.90 - 12.70 K/uL    RBC 5.04 4.00 - 5.40 M/uL    Hemoglobin 15.3 12.0 - 16.0 g/dL    Hematocrit 44.8 37.0 - 48.5 %    MCV 89 82 - 98 fL    MCH 30.4 27.0 - 31.0 pg    MCHC 34.2 32.0 - 36.0 g/dL    RDW 13.4 11.5 - 14.5 %    Platelets 295 150 - 350 K/uL    MPV 10.2 9.2 - 12.9 fL    Gran # (ANC) 2.4 1.8 - 7.7 K/uL    Lymph # 1.1 1.0 - 4.8 K/uL    Mono # 0.3 0.3 -  1.0 K/uL    Eos # 0.0 0.0 - 0.5 K/uL    Baso # 0.04 0.00 - 0.20 K/uL    Gran% 61.4 38.0 - 73.0 %    Lymph% 28.0 18.0 - 48.0 %    Mono% 8.6 4.0 - 15.0 %    Eosinophil% 1.0 0.0 - 8.0 %    Basophil% 1.0 0.0 - 1.9 %    Differential Method Automated      CMP  Results for orders placed or performed during the hospital encounter of 11/14/18   Comprehensive metabolic panel   Result Value Ref Range    Sodium 142 136 - 145 mmol/L    Potassium 3.3 (L) 3.5 - 5.1 mmol/L    Chloride 106 95 - 110 mmol/L    CO2 28 23 - 29 mmol/L    Glucose 94 70 - 110 mg/dL    BUN, Bld 9 6 - 20 mg/dL    Creatinine 0.7 0.5 - 1.4 mg/dL    Calcium 9.7 8.7 - 10.5 mg/dL    Total Protein 7.4 6.0 - 8.4 g/dL    Albumin 4.5 3.5 - 5.2 g/dL    Total Bilirubin 1.8 (H) 0.1 - 1.0 mg/dL    Alkaline Phosphatase 74 55 - 135 U/L    AST 23 10 - 40 U/L    ALT 25 10 - 44 U/L    Anion Gap 8 8 - 16 mmol/L    eGFR if African American >60 >60 mL/min/1.73 m^2    eGFR if non African American >60 >60 mL/min/1.73 m^2     TSH  Results for orders placed or performed during the hospital encounter of 11/14/18   TSH   Result Value Ref Range    TSH 1.665 0.400 - 4.000 uIU/mL     ETOH  Results for orders placed or performed during the hospital encounter of 11/14/18   Ethanol   Result Value Ref Range    Alcohol, Medical, Serum <10 <10 mg/dL     Salicylate  Results for orders placed or performed during the hospital encounter of 11/14/18   Salicylate level   Result Value Ref Range    Salicylate Lvl <5.0 (L) 15.0 - 30.0 mg/dL     Acetaminophen  Results for orders placed or performed during the hospital encounter of 11/14/18   Acetaminophen level   Result Value Ref Range    Acetaminophen (Tylenol), Serum <3.0 (L) 10.0 - 20.0 ug/mL       CTA 8/9/17 No CT findings for pulmonary embolism with the emboli seen in the right lung base on the previous study no longer visualized.  Some fibrosis in the left upper lung

## 2018-11-15 NOTE — PROGRESS NOTES
11/15/18 1030   [facility] Group Therapy   Group Name Therapeutic Recreation   Specific Interventions Coping Skills Training   Participation Level Active   Participation Quality Cooperative   Insight/Motivation Applies New Skills;Good   Affect/Mood Display Appropriate   Cognition Alert   Psychomotor WNL   Patient learned new leisure activity, becomes involved, remembers rules and directions.

## 2018-11-15 NOTE — ASSESSMENT & PLAN NOTE
Nutrition Problem  Inadequate oral intake      Related to (etiology):   Pt not eating all breakfast      Signs and Symptoms (as evidenced by):   Pt PO intake at 25%     Interventions  General/healthful diet  Multivitamin/mineral supplementation therpay     Recommendations (treatment strategy):  Recommend pt to continue and tolerate regular diet      Nutrition Diagnosis Status:   New
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
## 2018-11-16 PROCEDURE — 63600175 PHARM REV CODE 636 W HCPCS: Performed by: NURSE PRACTITIONER

## 2018-11-16 PROCEDURE — 25000003 PHARM REV CODE 250: Performed by: PSYCHIATRY & NEUROLOGY

## 2018-11-16 PROCEDURE — 90833 PSYTX W PT W E/M 30 MIN: CPT | Mod: ,,, | Performed by: PSYCHIATRY & NEUROLOGY

## 2018-11-16 PROCEDURE — 11400000 HC PSYCH PRIVATE ROOM

## 2018-11-16 PROCEDURE — 99233 SBSQ HOSP IP/OBS HIGH 50: CPT | Mod: ,,, | Performed by: PSYCHIATRY & NEUROLOGY

## 2018-11-16 RX ORDER — CLONAZEPAM 0.5 MG/1
0.5 TABLET ORAL 2 TIMES DAILY
Status: DISCONTINUED | OUTPATIENT
Start: 2018-11-16 | End: 2018-11-18 | Stop reason: HOSPADM

## 2018-11-16 RX ORDER — DULOXETIN HYDROCHLORIDE 30 MG/1
30 CAPSULE, DELAYED RELEASE ORAL DAILY
Status: DISCONTINUED | OUTPATIENT
Start: 2018-11-16 | End: 2018-11-18 | Stop reason: HOSPADM

## 2018-11-16 RX ADMIN — MIRTAZAPINE 15 MG: 15 TABLET, ORALLY DISINTEGRATING ORAL at 08:11

## 2018-11-16 RX ADMIN — CLONAZEPAM 0.5 MG: 0.5 TABLET ORAL at 08:11

## 2018-11-16 RX ADMIN — FOLIC ACID 1 MG: 1 TABLET ORAL at 08:11

## 2018-11-16 RX ADMIN — THERA TABS 1 TABLET: TAB at 08:11

## 2018-11-16 RX ADMIN — DULOXETINE HYDROCHLORIDE 30 MG: 30 CAPSULE, DELAYED RELEASE ORAL at 08:11

## 2018-11-16 RX ADMIN — ENOXAPARIN SODIUM 40 MG: 100 INJECTION SUBCUTANEOUS at 08:11

## 2018-11-16 NOTE — PLAN OF CARE
Problem: Overarching Goals (Adult)  Goal: Optimized Coping Skills in Response to Life Stressors    Intervention: Promote Effective Coping Strategies  Group Note    Behavior: Patient attended psychotherapy group with uninterested mood, and flat affect. Patient made poor eye contact, and kept her hand on her head.          Intervention: This is your life- Finding balance in our everyday lives .Patients ranked eight categories in order of importance to them and identified where they place too much or too little value and discussed ways to achieve better balance.          Response: Patient participated in the writing exercise, but refused to share feedback.          Plan: Will continue to encourage patient to attend psychotherapy group. Will meet 1:1 with patient later.
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
## 2018-11-16 NOTE — PLAN OF CARE
Problem: Patient Care Overview (Adult)  Goal: Plan of Care Review  Outcome: Ongoing (interventions implemented as appropriate)  Pt is calm and cooperative.  Denies any S/I or H/I at this time.  Mood has improved, pt states she enjoys talking with the other patient's in the dayroom.  Reports sleep was better last night.  Encouraged pt to continue following treatment plan as ordered and to report any issues to staff.  Pt verbalized understanding, will continue to monitor.

## 2018-11-16 NOTE — PLAN OF CARE
Problem: Mood Impairment (Depressive Signs/Symptoms) (Adult)  Goal: Improved Mood Symptoms  Outcome: Ongoing (interventions implemented as appropriate)  Accepting meds as ordered. Rated depression and anxiety  5/10.

## 2018-11-16 NOTE — PLAN OF CARE
Problem: Patient Care Overview (Adult)  Goal: Plan of Care Review  Outcome: Ongoing (interventions implemented as appropriate)  Plan of care reviewed.  Denies intent to harm self or others at this time.  Accepts all meals and medications.  Gait steady, no falls.  Pleasant, interacts with staff and peers. Stays out on the unit in the dayroom.  States she likes talking with everyone.  Also stated that she napped today and she doesn't remember the last time that she was able to do that.  Promoted an individualized safety plan, reality-based interactions, effective coping strategies, and impulse control.  Will continue to monitor for safety.

## 2018-11-17 LAB
BASOPHILS # BLD AUTO: 0.03 K/UL
BASOPHILS NFR BLD: 0.8 %
DIFFERENTIAL METHOD: ABNORMAL
EOSINOPHIL # BLD AUTO: 0 K/UL
EOSINOPHIL NFR BLD: 1.1 %
ERYTHROCYTE [DISTWIDTH] IN BLOOD BY AUTOMATED COUNT: 13.2 %
HCT VFR BLD AUTO: 39.6 %
HGB BLD-MCNC: 13.3 G/DL
LYMPHOCYTES # BLD AUTO: 1.3 K/UL
LYMPHOCYTES NFR BLD: 35.5 %
MCH RBC QN AUTO: 30.2 PG
MCHC RBC AUTO-ENTMCNC: 33.6 G/DL
MCV RBC AUTO: 90 FL
MONOCYTES # BLD AUTO: 0.4 K/UL
MONOCYTES NFR BLD: 10.1 %
NEUTROPHILS # BLD AUTO: 1.9 K/UL
NEUTROPHILS NFR BLD: 52.5 %
PLATELET # BLD AUTO: 245 K/UL
PMV BLD AUTO: 10.6 FL
RBC # BLD AUTO: 4.4 M/UL
WBC # BLD AUTO: 3.58 K/UL

## 2018-11-17 PROCEDURE — 11400000 HC PSYCH PRIVATE ROOM

## 2018-11-17 PROCEDURE — 25000003 PHARM REV CODE 250: Performed by: PSYCHIATRY & NEUROLOGY

## 2018-11-17 PROCEDURE — 99233 SBSQ HOSP IP/OBS HIGH 50: CPT | Mod: ,,, | Performed by: PSYCHIATRY & NEUROLOGY

## 2018-11-17 PROCEDURE — 85025 COMPLETE CBC W/AUTO DIFF WBC: CPT

## 2018-11-17 PROCEDURE — 36415 COLL VENOUS BLD VENIPUNCTURE: CPT

## 2018-11-17 PROCEDURE — 63600175 PHARM REV CODE 636 W HCPCS: Performed by: NURSE PRACTITIONER

## 2018-11-17 RX ADMIN — FOLIC ACID 1 MG: 1 TABLET ORAL at 08:11

## 2018-11-17 RX ADMIN — CLONAZEPAM 0.5 MG: 0.5 TABLET ORAL at 08:11

## 2018-11-17 RX ADMIN — THERA TABS 1 TABLET: TAB at 08:11

## 2018-11-17 RX ADMIN — DULOXETINE HYDROCHLORIDE 30 MG: 30 CAPSULE, DELAYED RELEASE ORAL at 08:11

## 2018-11-17 RX ADMIN — MIRTAZAPINE 15 MG: 15 TABLET, ORALLY DISINTEGRATING ORAL at 08:11

## 2018-11-17 RX ADMIN — ENOXAPARIN SODIUM 40 MG: 100 INJECTION SUBCUTANEOUS at 08:11
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
## 2018-11-17 NOTE — PLAN OF CARE
"Problem: Overarching Goals (Adult)  Goal: Develops/Participates in Therapeutic [place] to Support Successful Transition    Intervention: Mutually Develop Transition Plan  Spoke with patient's mother Pamela Wing [phone].   "I talked with her a couple days ago and she was hyper and not doing well and today she is really mellowed out. She doesn't talk as fast and is kind of letting things go which is good.   Mother states patient has been struggling "for about a year now. It's gotten worse in the last 5-6 months. She was on the edge. She's got a little girl and every thing she did she would holler. She knew that was wrong but she couldn't control it."   Mother does not think patient is a danger to herself. "I wouldn't think that. She loves life too much. She fought the ultimate, the cancer. She has a lot to live for. She's at a Level point.  Sounds like its fine."         "

## 2018-11-17 NOTE — PLAN OF CARE
Problem: Patient Care Overview (Adult)  Goal: Plan of Care Review  Outcome: Ongoing (interventions implemented as appropriate)   11/16/18 2100   Coping/Psychosocial   Plan Of Care Reviewed With patient     Patient is calm and cooperative with staff. She took her PM medication with no problem and voiced that she was feeling better because she thinks that the new medication is working.   She denies depression, SI, HI, or plan. She watched television in the dayroom

## 2018-11-17 NOTE — PLAN OF CARE
Problem: Overarching Goals (Adult)  Goal: Optimized Coping Skills in Response to Life Stressors    Intervention: Promote Effective Coping Strategies  Group Note    Behavior:  Patient attended group and participated. Patient presents with calm but guarded mood and affect.       Intervention:  Self Portrait - patient's carlota their self image, depicting how they felt about themselves. Patient's shared and described their drawing. Discussed the importance and benefits of having a positive self image vs. A negative self image. Patient also listed their positive qualities.          Response:  Patient carlota a stick figure and a broken heart and sunshine. Patient states she went through a tough time, but is coming out alright.  Patient listed her positive qualities as easy to talk to, good mom, disciplined, friendly and driven (school).        Plan:  Will continue to encourage patient participation in group. Patient is preparing for discharge on tomorrow.

## 2018-11-17 NOTE — PLAN OF CARE
Problem: Patient Care Overview (Adult)  Goal: Discharge Needs Assessment  Patient to discharge to home. Patient's car is in the parking lot. Patient will FU with [facility]. Patient signed a release of information.
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
## 2018-11-17 NOTE — PLAN OF CARE
Problem: Patient Care Overview (Adult)  Goal: Plan of Care Review  Outcome: Ongoing (interventions implemented as appropriate)  Pt in room in bed majority of shift.Pt reports sleeping well last night.Pt feeling much better and anxiety much improved.Pt is focused on going home.Not eating much and only ate 25% of lunch and no breakfast.Medication compliant.

## 2018-11-18 VITALS
SYSTOLIC BLOOD PRESSURE: 106 MMHG | DIASTOLIC BLOOD PRESSURE: 64 MMHG | HEART RATE: 65 BPM | WEIGHT: 145.06 LBS | HEIGHT: 61 IN | BODY MASS INDEX: 27.39 KG/M2 | RESPIRATION RATE: 16 BRPM | TEMPERATURE: 96 F

## 2018-11-18 PROCEDURE — 25000003 PHARM REV CODE 250: Performed by: PSYCHIATRY & NEUROLOGY

## 2018-11-18 PROCEDURE — 99239 HOSP IP/OBS DSCHRG MGMT >30: CPT | Mod: ,,, | Performed by: PSYCHIATRY & NEUROLOGY

## 2018-11-18 RX ORDER — ENOXAPARIN SODIUM 100 MG/ML
40 INJECTION SUBCUTANEOUS NIGHTLY
Qty: 12 ML | Refills: 0 | Status: SHIPPED | OUTPATIENT
Start: 2018-11-18 | End: 2020-01-15

## 2018-11-18 RX ORDER — CLONAZEPAM 0.5 MG/1
0.5 TABLET ORAL 2 TIMES DAILY
Qty: 60 TABLET | Refills: 0 | Status: SHIPPED | OUTPATIENT
Start: 2018-11-18 | End: 2020-01-15

## 2018-11-18 RX ORDER — DULOXETIN HYDROCHLORIDE 30 MG/1
30 CAPSULE, DELAYED RELEASE ORAL DAILY
Qty: 30 CAPSULE | Refills: 0 | Status: SHIPPED | OUTPATIENT
Start: 2018-11-19 | End: 2020-01-15

## 2018-11-18 RX ORDER — MIRTAZAPINE 15 MG/1
15 TABLET, FILM COATED ORAL NIGHTLY
Qty: 30 TABLET | Refills: 1 | Status: SHIPPED | OUTPATIENT
Start: 2018-11-18 | End: 2020-01-15

## 2018-11-18 RX ADMIN — FOLIC ACID 1 MG: 1 TABLET ORAL at 08:11

## 2018-11-18 RX ADMIN — DULOXETINE HYDROCHLORIDE 30 MG: 30 CAPSULE, DELAYED RELEASE ORAL at 08:11

## 2018-11-18 RX ADMIN — THERA TABS 1 TABLET: TAB at 08:11

## 2018-11-18 RX ADMIN — CLONAZEPAM 0.5 MG: 0.5 TABLET ORAL at 08:11
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
## 2018-11-18 NOTE — PLAN OF CARE
"Problem: Patient Care Overview (Adult)  Goal: Plan of Care Review  Outcome: Ongoing (interventions implemented as appropriate)  Pt. Cont.t o maintain her gains, noted in dayroom at start of the shift interacting with staff, stating" I am not coming back here" and would smile. Pt. Report improved mood. Looking forward to discharge. Pt. Slept 7 hours so far this shift. Safety and comfort maintained. Cont. Plan.      "

## 2018-11-19 NOTE — PSYCH
Sent packet to [facility] for aftercare appointment.  It will be on 11/27/2018 at 8 am.  Called patient at [phone] and informed her of the appointment date and time.  Patient wrote appointment down and voiced understanding. Called patient at 10:12 am on 11/19/2018.

## 2019-11-05 ENCOUNTER — HOSPITAL ENCOUNTER (EMERGENCY)
Facility: HOSPITAL | Age: 44
Discharge: HOME | End: 2019-11-05
Attending: SURGERY
Payer: MEDICAID

## 2019-11-05 VITALS
BODY MASS INDEX: 32 KG/M2 | HEIGHT: 60 IN | SYSTOLIC BLOOD PRESSURE: 120 MMHG | TEMPERATURE: 98 F | DIASTOLIC BLOOD PRESSURE: 77 MMHG | WEIGHT: 163 LBS | RESPIRATION RATE: 18 BRPM | OXYGEN SATURATION: 99 % | HEART RATE: 77 BPM

## 2019-11-05 DIAGNOSIS — S41.102S OPEN WOUND OF LEFT AXILLARY REGION, SEQUELA: Primary | ICD-10-CM

## 2019-11-05 PROCEDURE — 99284 EMERGENCY DEPT VISIT MOD MDM: CPT

## 2019-11-05 RX ORDER — HYDROCODONE BITARTRATE AND ACETAMINOPHEN 7.5; 325 MG/1; MG/1
1 TABLET ORAL EVERY 8 HOURS PRN
Qty: 6 TABLET | Refills: 0 | Status: SHIPPED | OUTPATIENT
Start: 2019-11-05 | End: 2020-01-15

## 2019-11-05 RX ORDER — CLINDAMYCIN HYDROCHLORIDE 300 MG/1
300 CAPSULE ORAL EVERY 6 HOURS
Qty: 10 CAPSULE | Refills: 0 | Status: SHIPPED | OUTPATIENT
Start: 2019-11-05 | End: 2019-11-08

## 2019-11-05 NOTE — ED PROVIDER NOTES
Encounter Date: 2019       History     Chief Complaint   Patient presents with    Post-op Problem     left sided mastectomy     Patient is 44-year-old white female who was diagnosed with stage III left breast cancer in 2017.  She underwent chemo and radiation therapy.  She has small slowly healing open wound in the left axilla at the site of 1 of her incisions.  She is completing a course of Cleocin, still has small open wound but no surrounding cellulitis.        Review of patient's allergies indicates:  No Known Allergies  Past Medical History:   Diagnosis Date    Abnormal Pap smear of cervix     ASCUS +HPV    Anxiety     Breast CA 2017    stage 3    Depression     Hx of psychiatric care     Neuropathy     Psychiatric problem     Pulmonary embolism     Sleep difficulties      Past Surgical History:   Procedure Laterality Date    BREAST SURGERY       SECTION      fallopian tube removal      infusaport      LYMPHADENECTOMY Left     MASTECTOMY Left      Family History   Problem Relation Age of Onset    Anxiety disorder Mother     Breast cancer Neg Hx     Colon cancer Neg Hx     Ovarian cancer Neg Hx      Social History     Tobacco Use    Smoking status: Never Smoker    Smokeless tobacco: Never Used   Substance Use Topics    Alcohol use: No    Drug use: No     Review of Systems   Constitutional: Positive for fever.   Skin: Positive for wound.       Physical Exam     Initial Vitals [19 1331]   BP Pulse Resp Temp SpO2   126/78 76 20 97.9 °F (36.6 °C) 100 %      MAP       --         Physical Exam    Nursing note and vitals reviewed.  Constitutional: She appears well-nourished.   HENT:   Head: Normocephalic and atraumatic.   Eyes: EOM are normal. Pupils are equal, round, and reactive to light.   Neck: Normal range of motion. Neck supple.   Cardiovascular: Normal rate.   Pulmonary/Chest: Breath sounds normal. No respiratory distress.   Neurological: She is alert. She has normal  reflexes. No cranial nerve deficit.   Skin: Skin is warm.   Open, clean, granulating wound left axilla   Psychiatric: She has a normal mood and affect. Thought content normal.         ED Course   Procedures  Labs Reviewed - No data to display       Imaging Results    None                                          Clinical Impression:       ICD-10-CM ICD-9-CM   1. Open wound of left axillary region, sequela S41.102S 906.1         Disposition:   Disposition: Discharged  Condition: Stable                     Edmundo Bocanegra Jr., MD  11/05/19 1458       Edmundo Bocanegra Jr., MD  11/26/19 2570

## 2019-11-05 NOTE — ED TRIAGE NOTES
Left sided mastectomy in 2017. Incision has been reopening off and on. Has been putting antibiotics and gauze, but hasn't closed up.

## 2020-01-15 PROBLEM — T78.40XA ALLERGIC REACTION: Status: ACTIVE | Noted: 2020-01-15

## 2020-02-03 PROBLEM — L08.89 OTHER SPECIFIED LOCAL INFECTIONS OF THE SKIN AND SUBCUTANEOUS TISSUE: Status: ACTIVE | Noted: 2020-02-03

## 2020-08-02 ENCOUNTER — OFFICE VISIT (OUTPATIENT)
Dept: URGENT CARE | Facility: CLINIC | Age: 45
End: 2020-08-02
Payer: MEDICAID

## 2020-08-02 VITALS
HEART RATE: 115 BPM | DIASTOLIC BLOOD PRESSURE: 74 MMHG | BODY MASS INDEX: 29.49 KG/M2 | OXYGEN SATURATION: 98 % | TEMPERATURE: 99 F | WEIGHT: 151 LBS | SYSTOLIC BLOOD PRESSURE: 105 MMHG

## 2020-08-02 DIAGNOSIS — R11.0 NAUSEA WITHOUT VOMITING: ICD-10-CM

## 2020-08-02 DIAGNOSIS — L08.9 SKIN INFECTION: ICD-10-CM

## 2020-08-02 DIAGNOSIS — R50.9 FEVER, UNSPECIFIED FEVER CAUSE: ICD-10-CM

## 2020-08-02 DIAGNOSIS — N64.4 BREAST PAIN, LEFT: Primary | ICD-10-CM

## 2020-08-02 PROCEDURE — 99214 OFFICE O/P EST MOD 30 MIN: CPT | Mod: S$GLB,,, | Performed by: NURSE PRACTITIONER

## 2020-08-02 PROCEDURE — 99214 PR OFFICE/OUTPT VISIT, EST, LEVL IV, 30-39 MIN: ICD-10-PCS | Mod: S$GLB,,, | Performed by: NURSE PRACTITIONER

## 2020-08-02 PROCEDURE — U0003 INFECTIOUS AGENT DETECTION BY NUCLEIC ACID (DNA OR RNA); SEVERE ACUTE RESPIRATORY SYNDROME CORONAVIRUS 2 (SARS-COV-2) (CORONAVIRUS DISEASE [COVID-19]), AMPLIFIED PROBE TECHNIQUE, MAKING USE OF HIGH THROUGHPUT TECHNOLOGIES AS DESCRIBED BY CMS-2020-01-R: HCPCS

## 2020-08-02 RX ORDER — KETOROLAC TROMETHAMINE 30 MG/ML
30 INJECTION, SOLUTION INTRAMUSCULAR; INTRAVENOUS
Status: DISCONTINUED | OUTPATIENT
Start: 2020-08-02 | End: 2020-08-02

## 2020-08-02 RX ORDER — ONDANSETRON HYDROCHLORIDE 8 MG/1
8 TABLET, FILM COATED ORAL EVERY 8 HOURS PRN
Qty: 6 TABLET | Refills: 0 | Status: SHIPPED | OUTPATIENT
Start: 2020-08-02

## 2020-08-02 RX ORDER — NAPROXEN 500 MG/1
500 TABLET ORAL 2 TIMES DAILY WITH MEALS
Qty: 20 TABLET | Refills: 0 | Status: SHIPPED | OUTPATIENT
Start: 2020-08-02 | End: 2020-08-12

## 2020-08-02 RX ORDER — KETOROLAC TROMETHAMINE 30 MG/ML
30 INJECTION, SOLUTION INTRAMUSCULAR; INTRAVENOUS
Status: COMPLETED | OUTPATIENT
Start: 2020-08-02 | End: 2020-08-02

## 2020-08-02 RX ORDER — ONDANSETRON 4 MG/1
8 TABLET, ORALLY DISINTEGRATING ORAL EVERY 6 HOURS PRN
Qty: 15 TABLET | Refills: 0 | Status: CANCELLED | OUTPATIENT
Start: 2020-08-02

## 2020-08-02 RX ORDER — CEFADROXIL 500 MG/1
1 CAPSULE ORAL EVERY 12 HOURS
Qty: 16 CAPSULE | Refills: 0 | Status: SHIPPED | OUTPATIENT
Start: 2020-08-02 | End: 2020-08-03

## 2020-08-02 RX ADMIN — KETOROLAC TROMETHAMINE 30 MG: 30 INJECTION, SOLUTION INTRAMUSCULAR; INTRAVENOUS at 04:08

## 2020-08-02 NOTE — PROGRESS NOTES
"Subjective:       Patient ID: Val Wing is a 44 y.o. female.    Vitals:  weight is 68.5 kg (151 lb). Her oral temperature is 98.6 °F (37 °C). Her blood pressure is 105/74 and her pulse is 115 (abnormal). Her oxygen saturation is 98%.     Chief Complaint: surgery complications    Pt states she had an expander in her left breast on the 7/9/2020. She states it is starting to hurt and looks red. Having fever with redness at upper breast Friday after follow up with surgeon. Have a follow up with surgeon on Tuesday. Took cephelaxin 500mg x 2 weeks last pill was Thursday.    Other  This is a new problem. Episode onset: 2 days ago. The problem occurs constantly. The problem has been gradually worsening. Associated symptoms include a fever (102.6) and nausea. Pertinent negatives include no arthralgias, chills, coughing, fatigue, headaches, joint swelling, myalgias, rash or vomiting ("I don't let it come up, it taste nasty"). She has tried acetaminophen and NSAIDs for the symptoms. The treatment provided mild relief.       Constitution: Positive for fever (102.6). Negative for chills and fatigue.   Eyes: Negative for double vision and blurred vision.   Respiratory: Negative for cough and shortness of breath.    Gastrointestinal: Positive for nausea. Negative for vomiting ("I don't let it come up, it taste nasty") and diarrhea.   Genitourinary: Negative for frequency and history of kidney stones.   Musculoskeletal: Negative for joint pain, joint swelling, muscle cramps and muscle ache.   Skin: Positive for erythema (above incision site above left breast and is warm to touch). Negative for color change, pale, rash and bruising.   Allergic/Immunologic: Negative for seasonal allergies.   Neurological: Positive for dizziness. Negative for light-headedness, passing out and headaches.   Psychiatric/Behavioral: Negative for nervous/anxious. The patient is not nervous/anxious.        Objective:      Physical Exam "   Constitutional: She is oriented to person, place, and time. She appears well-developed.   HENT:   Head: Normocephalic and atraumatic. Head is without abrasion, without contusion and without laceration.   Ears:   Right Ear: External ear normal.   Left Ear: External ear normal.   Nose: Nose normal.   Mouth/Throat: Oropharynx is clear and moist and mucous membranes are normal.   Eyes: Pupils are equal, round, and reactive to light. Conjunctivae, EOM and lids are normal.   Neck: Trachea normal, full passive range of motion without pain and phonation normal. Neck supple.   Cardiovascular: Regular rhythm and normal heart sounds. Tachycardia present.   Pulmonary/Chest: Effort normal and breath sounds normal. No stridor. No respiratory distress.   Musculoskeletal: Normal range of motion.   Neurological: She is alert and oriented to person, place, and time.   Skin: Skin is warm, dry, intact and no rash. Capillary refill takes less than 2 seconds. Lesions:  erythema (above incision site above left breast and is warm to touch)abrasion, burn, bruising and ecchymosisPsychiatric: Her speech is normal and behavior is normal. Judgment and thought content normal.   Nursing note and vitals reviewed.        Assessment:       1. Breast pain, left    2. Nausea without vomiting    3. Fever, unspecified fever cause    4. Skin infection        Administrations This Visit     ketorolac injection 30 mg     Admin Date  08/02/2020 Action  Given Dose  30 mg Route  Intramuscular Administered By  Arleth Rust MA              Plan:         Breast pain, left  -     Discontinue: ketorolac injection 30 mg  -     naproxen (NAPROSYN) 500 MG tablet; Take 1 tablet (500 mg total) by mouth 2 (two) times daily with meals. for 10 days  Dispense: 20 tablet; Refill: 0  -     ketorolac injection 30 mg    Nausea without vomiting  -     ondansetron (ZOFRAN) 8 MG tablet; Take 1 tablet (8 mg total) by mouth every 8 (eight) hours as needed for Nausea.  Dispense: 6  tablet; Refill: 0    Fever, unspecified fever cause  -     COVID-19 Routine Screening    Skin infection  -     cefadroxil (DURICEF) 500 MG Cap; Take 2 capsules (1 g total) by mouth every 12 (twelve) hours. for 4 days  Dispense: 16 capsule; Refill: 0      Patient Instructions   Please go to the ER if symptoms worsen over night.  Call surgeon office and speak with on call nurse so that an appointment can be created or advice on what to do for care.  Wound Check, Infection  You have a wound that has become infected. The wound will not heal properly unless the infection is cleared. Infection in a wound may also spread if it is not treated. In most cases, antibiotic medicines are prescribed to treat a wound infection.   Symptoms of a wound infection include:  · Redness or swelling around the wound  · Warmth coming from the wound  · New or worsening pain  · Red streaks around the wound  · Draining pus  · Fever  Home care  Follow all directions you are given to treat the infection.  Medicines  Take all medicines as prescribed.   · If you were given antibiotics, take them until they are gone or your healthcare provider tells you to stop. It is vital to finish the antibiotics even if you feel better. If you do not finish them, the infection may come back and be harder to treat.  · If your infection is not responding to the medicines you are taking, you may be prescribed new medicines.  · Take medicine for pain as directed by your healthcare provider.  Wound care  Care for your wound as directed by your healthcare provider.  · Apply a warm compress (clean cloth soaked in hot water) to the infected area for about 5 to 10 minutes at a time. Be very careful not to burn yourself. Test the cloth on a non-infected area to make sure it is not too hot.  · Continue to change the dressing daily. If it becomes wet, stained with wound fluid, or dirty, change it sooner. To change it:  ¨ Wash your hands with soap and water before changing  the dressing.  ¨ Carefully remove the dressing and tape. If it sticks to the wound, you may need to wet it a little to remove it. (Do not do this if your healthcare provider has told you not to.)  ¨ Gently clean the wound with clean water (or saline) using gauze, a clean washcloth, or cotton swab.  ¨ Do not use soap, alcohol, peroxide or other cleansers.  ¨ If you were told to dry the wound before putting on a new dressing, gently pat. Do not rub.  ¨ Throw out the old dressing.  ¨ Wash your hands again before opening the new, clean dressing.  ¨ Wash your hands again when you are done.  Follow-up care  Follow up with your healthcare provider as advised. If a culture was done, you will be notified if your treatment needs to change. Call as directed for the results.  When to seek medical advice  Call your health care provider right away if any of these occur:  · Symptoms of infection don't start to improve within 2 days of starting antibiotics  · Symptoms of infection get worse  · New symptoms, such as red streaks around the wound  · Fever of 100.4°F (38.0°C) or higher for more than 2 days after starting the antibiotics  Date Last Reviewed: 8/10/2015  © 2048-0570 [employer]. [address] [place] [address], [place] [address]. All rights reserved. This information is not intended as a substitute for professional medical care. Always follow your healthcare professional's instructions.

## 2020-08-02 NOTE — PATIENT INSTRUCTIONS
Please go to the ER if symptoms worsen over night.  Call surgeon office and speak with on call nurse so that an appointment can be created or advice on what to do for care.  Wound Check, Infection  You have a wound that has become infected. The wound will not heal properly unless the infection is cleared. Infection in a wound may also spread if it is not treated. In most cases, antibiotic medicines are prescribed to treat a wound infection.   Symptoms of a wound infection include:  · Redness or swelling around the wound  · Warmth coming from the wound  · New or worsening pain  · Red streaks around the wound  · Draining pus  · Fever  Home care  Follow all directions you are given to treat the infection.  Medicines  Take all medicines as prescribed.   · If you were given antibiotics, take them until they are gone or your healthcare provider tells you to stop. It is vital to finish the antibiotics even if you feel better. If you do not finish them, the infection may come back and be harder to treat.  · If your infection is not responding to the medicines you are taking, you may be prescribed new medicines.  · Take medicine for pain as directed by your healthcare provider.  Wound care  Care for your wound as directed by your healthcare provider.  · Apply a warm compress (clean cloth soaked in hot water) to the infected area for about 5 to 10 minutes at a time. Be very careful not to burn yourself. Test the cloth on a non-infected area to make sure it is not too hot.  · Continue to change the dressing daily. If it becomes wet, stained with wound fluid, or dirty, change it sooner. To change it:  ¨ Wash your hands with soap and water before changing the dressing.  ¨ Carefully remove the dressing and tape. If it sticks to the wound, you may need to wet it a little to remove it. (Do not do this if your healthcare provider has told you not to.)  ¨ Gently clean the wound with clean water (or saline) using gauze, a clean  washcloth, or cotton swab.  ¨ Do not use soap, alcohol, peroxide or other cleansers.  ¨ If you were told to dry the wound before putting on a new dressing, gently pat. Do not rub.  ¨ Throw out the old dressing.  ¨ Wash your hands again before opening the new, clean dressing.  ¨ Wash your hands again when you are done.  Follow-up care  Follow up with your healthcare provider as advised. If a culture was done, you will be notified if your treatment needs to change. Call as directed for the results.  When to seek medical advice  Call your health care provider right away if any of these occur:  · Symptoms of infection don't start to improve within 2 days of starting antibiotics  · Symptoms of infection get worse  · New symptoms, such as red streaks around the wound  · Fever of 100.4°F (38.0°C) or higher for more than 2 days after starting the antibiotics  Date Last Reviewed: 8/10/2015  © 8175-5284 [employer] [address] [place] [address], [place] [address]. All rights reserved. This information is not intended as a substitute for professional medical care. Always follow your healthcare professional's instructions.

## 2020-08-03 ENCOUNTER — HOSPITAL ENCOUNTER (EMERGENCY)
Facility: HOSPITAL | Age: 45
Discharge: HOME | End: 2020-08-03
Attending: SURGERY
Payer: MEDICAID

## 2020-08-03 VITALS
TEMPERATURE: 99 F | HEART RATE: 100 BPM | DIASTOLIC BLOOD PRESSURE: 71 MMHG | OXYGEN SATURATION: 99 % | SYSTOLIC BLOOD PRESSURE: 121 MMHG | RESPIRATION RATE: 18 BRPM

## 2020-08-03 DIAGNOSIS — N61.0 CELLULITIS OF LEFT BREAST: Primary | ICD-10-CM

## 2020-08-03 DIAGNOSIS — R06.02 SHORTNESS OF BREATH: ICD-10-CM

## 2020-08-03 DIAGNOSIS — R50.9 FEVER, UNSPECIFIED FEVER CAUSE: ICD-10-CM

## 2020-08-03 LAB
ALBUMIN SERPL BCP-MCNC: 2.8 G/DL (ref 3.5–5.2)
ALP SERPL-CCNC: 109 U/L (ref 55–135)
ALT SERPL W/O P-5'-P-CCNC: 14 U/L (ref 10–44)
ANION GAP SERPL CALC-SCNC: 11 MMOL/L (ref 8–16)
AST SERPL-CCNC: 14 U/L (ref 10–40)
B-HCG UR QL: NEGATIVE
BACTERIA #/AREA URNS HPF: ABNORMAL /HPF
BASOPHILS # BLD AUTO: 0.05 K/UL (ref 0–0.2)
BASOPHILS NFR BLD: 0.4 % (ref 0–1.9)
BILIRUB SERPL-MCNC: 1 MG/DL (ref 0.1–1)
BILIRUB UR QL STRIP: NEGATIVE
BNP SERPL-MCNC: 104 PG/ML (ref 0–99)
BUN SERPL-MCNC: 9 MG/DL (ref 6–20)
CALCIUM SERPL-MCNC: 9.8 MG/DL (ref 8.7–10.5)
CHLORIDE SERPL-SCNC: 103 MMOL/L (ref 95–110)
CK MB SERPL-MCNC: 0.3 NG/ML (ref 0.1–6.5)
CK MB SERPL-RTO: 1.4 % (ref 0–5)
CK SERPL-CCNC: 22 U/L (ref 20–180)
CK SERPL-CCNC: 22 U/L (ref 20–180)
CLARITY UR: CLEAR
CO2 SERPL-SCNC: 26 MMOL/L (ref 23–29)
COLOR UR: YELLOW
CREAT SERPL-MCNC: 0.6 MG/DL (ref 0.5–1.4)
D DIMER PPP IA.FEU-MCNC: 1.14 MG/L FEU
DIFFERENTIAL METHOD: ABNORMAL
EOSINOPHIL # BLD AUTO: 0.3 K/UL (ref 0–0.5)
EOSINOPHIL NFR BLD: 2.3 % (ref 0–8)
ERYTHROCYTE [DISTWIDTH] IN BLOOD BY AUTOMATED COUNT: 13 % (ref 11.5–14.5)
EST. GFR  (AFRICAN AMERICAN): >60 ML/MIN/1.73 M^2
EST. GFR  (NON AFRICAN AMERICAN): >60 ML/MIN/1.73 M^2
GLUCOSE SERPL-MCNC: 102 MG/DL (ref 70–110)
GLUCOSE UR QL STRIP: NEGATIVE
HCT VFR BLD AUTO: 35.1 % (ref 37–48.5)
HGB BLD-MCNC: 11.6 G/DL (ref 12–16)
HGB UR QL STRIP: ABNORMAL
HYALINE CASTS #/AREA URNS LPF: 0 /LPF
IMM GRANULOCYTES # BLD AUTO: 0.1 K/UL (ref 0–0.04)
IMM GRANULOCYTES NFR BLD AUTO: 0.8 % (ref 0–0.5)
KETONES UR QL STRIP: NEGATIVE
LACTATE SERPL-SCNC: 2.1 MMOL/L (ref 0.5–2.2)
LEUKOCYTE ESTERASE UR QL STRIP: NEGATIVE
LYMPHOCYTES # BLD AUTO: 1.2 K/UL (ref 1–4.8)
LYMPHOCYTES NFR BLD: 9.7 % (ref 18–48)
MCH RBC QN AUTO: 29.4 PG (ref 27–31)
MCHC RBC AUTO-ENTMCNC: 33 G/DL (ref 32–36)
MCV RBC AUTO: 89 FL (ref 82–98)
MICROSCOPIC COMMENT: ABNORMAL
MONOCYTES # BLD AUTO: 0.8 K/UL (ref 0.3–1)
MONOCYTES NFR BLD: 6.8 % (ref 4–15)
NEUTROPHILS # BLD AUTO: 9.5 K/UL (ref 1.8–7.7)
NEUTROPHILS NFR BLD: 80 % (ref 38–73)
NITRITE UR QL STRIP: NEGATIVE
NRBC BLD-RTO: 0 /100 WBC
PH UR STRIP: 8 [PH] (ref 5–8)
PLATELET # BLD AUTO: 327 K/UL (ref 150–350)
PMV BLD AUTO: 10.1 FL (ref 9.2–12.9)
POTASSIUM SERPL-SCNC: 4.2 MMOL/L (ref 3.5–5.1)
PROT SERPL-MCNC: 7 G/DL (ref 6–8.4)
PROT UR QL STRIP: ABNORMAL
RBC # BLD AUTO: 3.94 M/UL (ref 4–5.4)
RBC #/AREA URNS HPF: 10 /HPF (ref 0–4)
SARS-COV-2 RDRP RESP QL NAA+PROBE: NEGATIVE
SODIUM SERPL-SCNC: 140 MMOL/L (ref 136–145)
SP GR UR STRIP: 1.02 (ref 1–1.03)
SQUAMOUS #/AREA URNS HPF: 3 /HPF
TROPONIN I SERPL DL<=0.01 NG/ML-MCNC: <0.006 NG/ML (ref 0–0.03)
URN SPEC COLLECT METH UR: ABNORMAL
UROBILINOGEN UR STRIP-ACNC: 1 EU/DL
WBC # BLD AUTO: 11.93 K/UL (ref 3.9–12.7)
WBC #/AREA URNS HPF: 3 /HPF (ref 0–5)

## 2020-08-03 PROCEDURE — 83605 ASSAY OF LACTIC ACID: CPT

## 2020-08-03 PROCEDURE — 93005 ELECTROCARDIOGRAM TRACING: CPT

## 2020-08-03 PROCEDURE — 81000 URINALYSIS NONAUTO W/SCOPE: CPT

## 2020-08-03 PROCEDURE — 85379 FIBRIN DEGRADATION QUANT: CPT

## 2020-08-03 PROCEDURE — 63600175 PHARM REV CODE 636 W HCPCS: Performed by: NURSE PRACTITIONER

## 2020-08-03 PROCEDURE — 82550 ASSAY OF CK (CPK): CPT

## 2020-08-03 PROCEDURE — 85025 COMPLETE CBC W/AUTO DIFF WBC: CPT

## 2020-08-03 PROCEDURE — 93010 ELECTROCARDIOGRAM REPORT: CPT | Mod: ,,, | Performed by: INTERNAL MEDICINE

## 2020-08-03 PROCEDURE — 80053 COMPREHEN METABOLIC PANEL: CPT

## 2020-08-03 PROCEDURE — 96365 THER/PROPH/DIAG IV INF INIT: CPT

## 2020-08-03 PROCEDURE — 84484 ASSAY OF TROPONIN QUANT: CPT

## 2020-08-03 PROCEDURE — 93010 EKG 12-LEAD: ICD-10-PCS | Mod: ,,, | Performed by: INTERNAL MEDICINE

## 2020-08-03 PROCEDURE — 81025 URINE PREGNANCY TEST: CPT

## 2020-08-03 PROCEDURE — 25500020 PHARM REV CODE 255: Performed by: SURGERY

## 2020-08-03 PROCEDURE — 25000003 PHARM REV CODE 250: Performed by: NURSE PRACTITIONER

## 2020-08-03 PROCEDURE — 83880 ASSAY OF NATRIURETIC PEPTIDE: CPT

## 2020-08-03 PROCEDURE — 96375 TX/PRO/DX INJ NEW DRUG ADDON: CPT

## 2020-08-03 PROCEDURE — 96361 HYDRATE IV INFUSION ADD-ON: CPT | Mod: 59

## 2020-08-03 PROCEDURE — 87040 BLOOD CULTURE FOR BACTERIA: CPT

## 2020-08-03 PROCEDURE — 99285 EMERGENCY DEPT VISIT HI MDM: CPT | Mod: 25

## 2020-08-03 PROCEDURE — U0002 COVID-19 LAB TEST NON-CDC: HCPCS

## 2020-08-03 PROCEDURE — 82553 CREATINE MB FRACTION: CPT

## 2020-08-03 RX ORDER — MORPHINE SULFATE 2 MG/ML
2 INJECTION, SOLUTION INTRAMUSCULAR; INTRAVENOUS
Status: COMPLETED | OUTPATIENT
Start: 2020-08-03 | End: 2020-08-03

## 2020-08-03 RX ORDER — CLINDAMYCIN HYDROCHLORIDE 300 MG/1
300 CAPSULE ORAL EVERY 6 HOURS
Qty: 28 CAPSULE | Refills: 0 | Status: SHIPPED | OUTPATIENT
Start: 2020-08-03 | End: 2020-08-10

## 2020-08-03 RX ORDER — CLINDAMYCIN PHOSPHATE 600 MG/50ML
600 INJECTION, SOLUTION INTRAVENOUS
Status: COMPLETED | OUTPATIENT
Start: 2020-08-03 | End: 2020-08-03

## 2020-08-03 RX ORDER — ONDANSETRON 2 MG/ML
4 INJECTION INTRAMUSCULAR; INTRAVENOUS
Status: COMPLETED | OUTPATIENT
Start: 2020-08-03 | End: 2020-08-03

## 2020-08-03 RX ORDER — HYDROCODONE BITARTRATE AND ACETAMINOPHEN 5; 325 MG/1; MG/1
1 TABLET ORAL EVERY 6 HOURS PRN
Qty: 8 TABLET | Refills: 0 | Status: SHIPPED | OUTPATIENT
Start: 2020-08-03

## 2020-08-03 RX ADMIN — SODIUM CHLORIDE 1000 ML: 0.9 INJECTION, SOLUTION INTRAVENOUS at 12:08

## 2020-08-03 RX ADMIN — IOHEXOL 75 ML: 350 INJECTION, SOLUTION INTRAVENOUS at 01:08

## 2020-08-03 RX ADMIN — ONDANSETRON 4 MG: 2 INJECTION INTRAMUSCULAR; INTRAVENOUS at 12:08

## 2020-08-03 RX ADMIN — CLINDAMYCIN IN 5 PERCENT DEXTROSE 600 MG: 12 INJECTION, SOLUTION INTRAVENOUS at 01:08

## 2020-08-03 RX ADMIN — MORPHINE SULFATE 2 MG: 2 INJECTION, SOLUTION INTRAMUSCULAR; INTRAVENOUS at 12:08

## 2020-08-03 NOTE — ED NOTES
PT REPORTS SHE HAD A HIGH FEVER YESTERDAY  AND WAS SEEN AT URGENT CARE. COVID TEST WAS DONE BUT NOT RESULTED YET. PT ADMITS TO SHORTNESS OF BREATH TODAY.

## 2020-08-03 NOTE — DISCHARGE INSTRUCTIONS
**Follow up with surgeon in 24-48 hours. Return to ER with worsening of symptoms.     **Over the counter tylenol or motrin as needed for pain and/or fever as directed on package insert. Drink plenty fluids. Get plenty rest. Wash hands frequently.     **Our goal in the emergency department is to always give you outstanding care and exceptional service. You may receive a survey by mail or e-mail in the next week regarding your experience in our ED. We would greatly appreciate your completing and returning the survey. Your feedback provides us with a way to recognize our staff who give very good care and it helps us learn how to improve when your experience was below our aspiration of excellence.

## 2020-08-03 NOTE — ED PROVIDER NOTES
Encounter Date: 8/3/2020       History     Chief Complaint   Patient presents with    Mastitis     PT REPORTS PAIN TO LEFT BREAST. PT HAD RECON SX ON HER LEFT BREAST IN THE PAST. HX OF BREAST CANCER.     Shortness of Breath     43 y/o female presents with fever, shortness of breath, and L breast pain x4 days. She is concerned that she has a breast infection. Complicated history of L breast reconstructive surgeries. She spoke with her surgeon who informed her to get a COVID swab.  L breast with tenderness, warmth, erythema and induration. No drainage. Current pain rated 7/10.   No URI symptoms or cough other than fever and SOB. SOB is worse with exertion and long periods of talking.   Tmax 102. Taking tylenol and motrin.     The history is provided by the patient.     Review of patient's allergies indicates:   Allergen Reactions    Sulfa (sulfonamide antibiotics) Hives    Bactrim [sulfamethoxazole-trimethoprim] Rash     Past Medical History:   Diagnosis Date    Abnormal Pap smear of cervix     ASCUS +HPV    Anxiety     Breast CA 2017    stage 3    Depression     Encounter for prophylactic removal of fallopian tube     Hx of psychiatric care     Neuropathy     Psychiatric problem     Pulmonary embolism     Sleep difficulties      Past Surgical History:   Procedure Laterality Date    BREAST SURGERY       SECTION      fallopian tube removal      infusaport      LYMPHADENECTOMY Left     masectomy      MASTECTOMY Left      Family History   Problem Relation Age of Onset    Anxiety disorder Mother     Breast cancer Neg Hx     Colon cancer Neg Hx     Ovarian cancer Neg Hx      Social History     Tobacco Use    Smoking status: Never Smoker    Smokeless tobacco: Never Used   Substance Use Topics    Alcohol use: No    Drug use: No     Review of Systems   Constitutional: Positive for fever.   HENT: Negative for congestion, ear pain, rhinorrhea, sore throat and trouble swallowing.    Eyes:  Negative for pain and redness.   Respiratory: Positive for shortness of breath. Negative for cough.    Cardiovascular: Negative for chest pain.   Gastrointestinal: Negative for abdominal pain.   Genitourinary: Negative for difficulty urinating and dysuria.   Musculoskeletal: Negative for arthralgias, back pain, myalgias and neck pain.   Skin: Positive for wound (L breast infection). Negative for rash.   Neurological: Negative for seizures, weakness and headaches.   Psychiatric/Behavioral: Negative.        Physical Exam     Initial Vitals [08/03/20 1157]   BP Pulse Resp Temp SpO2   116/79 (!) 121 (!) 22 97.8 °F (36.6 °C) 96 %      MAP       --         Physical Exam    Nursing note and vitals reviewed.  Constitutional: No distress (but appears to be in pain).   HENT:   Head: Normocephalic and atraumatic.   Right Ear: External ear normal.   Left Ear: External ear normal.   Nose: Nose normal.   Mouth/Throat: Oropharynx is clear and moist and mucous membranes are normal.   Eyes: Conjunctivae, EOM and lids are normal. Pupils are equal, round, and reactive to light.   Neck: Neck supple.   Cardiovascular: Regular rhythm, S1 normal, S2 normal, normal heart sounds and intact distal pulses. Tachycardia present.    Pulmonary/Chest: Effort normal and breath sounds normal. No respiratory distress. Left breast exhibits skin change (erythema, induration, warmth) and tenderness.   Abdominal: Soft. Bowel sounds are normal. There is no abdominal tenderness.   Musculoskeletal: Normal range of motion.   Neurological: She is alert and oriented to person, place, and time. She has normal strength. GCS eye subscore is 4. GCS verbal subscore is 5. GCS motor subscore is 6.   Skin: Skin is warm and dry. Capillary refill takes less than 2 seconds. No rash noted.   Psychiatric: She has a normal mood and affect. Her speech is normal and behavior is normal.         ED Course   Procedures  Labs Reviewed   CBC W/ AUTO DIFFERENTIAL - Abnormal;  Notable for the following components:       Result Value    RBC 3.94 (*)     Hemoglobin 11.6 (*)     Hematocrit 35.1 (*)     Immature Granulocytes 0.8 (*)     Gran # (ANC) 9.5 (*)     Immature Grans (Abs) 0.10 (*)     Gran% 80.0 (*)     Lymph% 9.7 (*)     All other components within normal limits   COMPREHENSIVE METABOLIC PANEL - Abnormal; Notable for the following components:    Albumin 2.8 (*)     All other components within normal limits   B-TYPE NATRIURETIC PEPTIDE - Abnormal; Notable for the following components:     (*)     All other components within normal limits   URINALYSIS, REFLEX TO URINE CULTURE - Abnormal; Notable for the following components:    Protein, UA 1+ (*)     Occult Blood UA 1+ (*)     All other components within normal limits    Narrative:     Specimen Source->Urine   URINALYSIS MICROSCOPIC - Abnormal; Notable for the following components:    RBC, UA 10 (*)     All other components within normal limits    Narrative:     Specimen Source->Urine   CULTURE, BLOOD   CULTURE, BLOOD   SARS-COV-2 RNA AMPLIFICATION, QUAL   CK-MB   CK   TROPONIN I   PREGNANCY TEST, URINE RAPID    Narrative:     Specimen Source->Urine   LACTIC ACID, PLASMA   D DIMER, QUANTITATIVE     EKG Readings: (Independently Interpreted)   EKG read with MD at the time it was performed. EKG without concerning findings.        ECG Results          EKG 12-lead (In process)  Result time 08/03/20 12:56:51    In process by Interface, Lab In [facility] (08/03/20 12:56:51)                 Narrative:    Test Reason : R06.02    Vent. Rate : 105 BPM     Atrial Rate : 105 BPM     P-R Int : 146 ms          QRS Dur : 076 ms      QT Int : 320 ms       P-R-T Axes : 031 021 027 degrees     QTc Int : 422 ms    Sinus tachycardia  Low voltage QRS  Cannot rule out Anterior infarct ,age undetermined  Abnormal ECG  When compared with ECG of 17-JUN-2020 12:28,  No significant change was found    Referred By: [email or web address]   SELF           Confirmed By:                              Imaging Results          CTA Chest Non-Coronary (PE Study) (Final result)  Result time 08/03/20 13:48:53    Final result by Carmen Christensen MD (08/03/20 13:48:53)                 Impression:      No pulmonary embolus is identified centrally.  Segmental and subsegmental emboli cannot be adequately excluded secondary to suboptimal contrast bolus.    Trace bilateral pleural effusions with adjacent passive atelectasis.  There is also platelike atelectasis bilateral lower lobes.    Postoperative changes of the left chest wall with mastectomy and breast spacer placement.  There is a small amount of subcutaneous emphysema along the left lateral chest wall along with inflammatory changes of the fat.  No focal fluid collections are seen to suggest abscess formation.    Focal 1.3 cm hypoattenuating lesion in the hepatic dome which was not visualized on previous CT scan of 2018.  If more recent imaging is available, it could be submitted for comparison purposes.  Alternatively, consider further evaluation with either MRI or CT scan of the abdomen, liver protocol, for further evaluation as metastatic disease cannot be excluded..      Electronically signed by: Carmen Christensen MD  Date:    08/03/2020  Time:    13:48             Narrative:    EXAMINATION:  CTA CHEST NON CORONARY    CLINICAL HISTORY:  PE suspected, high pretest prob;    TECHNIQUE:  Low dose axial images, sagittal and coronal reformations were obtained from the thoracic inlet to the lung bases following the IV administration of 75 mL of Omnipaque 350.  Contrast timing was optimized to evaluate the pulmonary arteries.  MIP images were performed.    COMPARISON:  08/09/2018    FINDINGS:  Evaluation for pulmonary embolus is limited by suboptimal contrast bolus.  No central obstructing pulmonary embolus is seen.    The thyroid appears normal.  The main central airways are patent.  The esophagus is unremarkable.  The heart is normal in size.   Great vessels are normal in course and caliber.  No pericardial effusion.  No mediastinal, hilar or axillary adenopathy.  Postoperative changes of left mastectomy with breast spacer present.  Small amount of subcutaneous emphysema along the left lateral chest wall likely related to recent drain removal.  There is some inflammatory stranding in the fat along the left lateral chest wall and out the breasts spacer, likely simply related to postoperative state.  No focal fluid collection seen to suggest abscess formation.    Small bilateral pleural effusions with adjacent passive atelectasis.  There is bibasilar platelike atelectasis.  No consolidation is seen.    Limited evaluation of the upper abdominal structures show a focal region of hypoattenuation in the hepatic dome measuring 1.3 cm.  This was not seen on the previous study of 2018.    Age-appropriate degenerative changes affect the skeleton.                               X-Ray Chest PA And Lateral (Final result)  Result time 08/03/20 13:36:47    Final result by Carmen Christensen MD (08/03/20 13:36:47)                 Impression:      No acute abnormality.      Electronically signed by: Carmen Christensen MD  Date:    08/03/2020  Time:    13:36             Narrative:    EXAMINATION:  XR CHEST PA AND LATERAL    CLINICAL HISTORY:  Shortness of breath    TECHNIQUE:  PA and lateral views of the chest were performed.    COMPARISON:  None    FINDINGS:  The lungs are clear, with normal appearance of pulmonary vasculature and no pleural effusion or pneumothorax.    The cardiac silhouette is normal in size. The hilar and mediastinal contours are unremarkable.    Bones are intact. Left axillary surgical clips.                                           Medications   clindamycin in D5W 600 mg/50 mL IVPB 600 mg (600 mg Intravenous New Bag 8/3/20 1347)   sodium chloride 0.9% bolus 1,000 mL (0 mLs Intravenous Stopped 8/3/20 1330)   morphine injection 2 mg (2 mg Intravenous Given  8/3/20 1252)   ondansetron injection 4 mg (4 mg Intravenous Given 8/3/20 1253)   iohexoL (OMNIPAQUE 350) injection 75 mL (75 mLs Intravenous Given 8/3/20 1339)   iohexoL (OMNIPAQUE 350) 350 mg iodine/mL injection (has no administration in time range)                 --Plan of care discussed with collaborating provider. Agrees with plan of care today.              Clinical Impression:       ICD-10-CM ICD-9-CM   1. Cellulitis of left breast  N61.0 611.0   2. Shortness of breath  R06.02 786.05   3. Fever, unspecified fever cause  R50.9 780.60     The patient acknowledges that close follow up with medical provider is required. Instructed to follow up with Surgeon tomorrow as scheduled. Patient was given specific return precautions. The patient agrees to comply with all instruction and directions given in the ER.     Disposition:   Disposition: Discharged  Condition: Stable     ED Disposition Condition    Discharge Stable        ED Prescriptions     Medication Sig Dispense Start Date End Date Auth. Provider    clindamycin (CLEOCIN) 300 MG capsule Take 1 capsule (300 mg total) by mouth every 6 (six) hours. for 7 days 28 capsule 8/3/2020 8/10/2020 Oralia Castellon NP    HYDROcodone-acetaminophen (NORCO) 5-325 mg per tablet Take 1 tablet by mouth every 6 (six) hours as needed for Pain. 8 tablet 8/3/2020  Oralia Castellon NP        Follow-up Information     Follow up With Specialties Details Why Contact Info    Devon Currie MD Plastic Surgery Schedule an appointment as soon as possible for a visit in 1 day  [address] [facility] [address]  [phone]                                       Oralia Castellon NP  08/03/20 [id number]

## 2020-08-04 LAB — SARS-COV-2 RNA RESP QL NAA+PROBE: NOT DETECTED

## 2020-08-08 LAB
BACTERIA BLD CULT: NORMAL
BACTERIA BLD CULT: NORMAL

## 2021-04-15 ENCOUNTER — OFFICE VISIT (OUTPATIENT)
Dept: URGENT CARE | Facility: CLINIC | Age: 46
End: 2021-04-15
Payer: MEDICAID

## 2021-04-15 VITALS
SYSTOLIC BLOOD PRESSURE: 110 MMHG | WEIGHT: 127 LBS | DIASTOLIC BLOOD PRESSURE: 77 MMHG | BODY MASS INDEX: 23.98 KG/M2 | OXYGEN SATURATION: 99 % | RESPIRATION RATE: 17 BRPM | TEMPERATURE: 98 F | HEIGHT: 61 IN | HEART RATE: 71 BPM

## 2021-04-15 DIAGNOSIS — J02.9 ACUTE PHARYNGITIS, UNSPECIFIED ETIOLOGY: ICD-10-CM

## 2021-04-15 DIAGNOSIS — Z11.59 SCREENING FOR VIRAL DISEASE: Primary | ICD-10-CM

## 2021-04-15 DIAGNOSIS — J01.40 ACUTE NON-RECURRENT PANSINUSITIS: ICD-10-CM

## 2021-04-15 LAB
CTP QC/QA: YES
SARS-COV-2 RDRP RESP QL NAA+PROBE: NEGATIVE

## 2021-04-15 PROCEDURE — 99214 PR OFFICE/OUTPT VISIT, EST, LEVL IV, 30-39 MIN: ICD-10-PCS | Mod: S$GLB,,, | Performed by: FAMILY MEDICINE

## 2021-04-15 PROCEDURE — U0002: ICD-10-PCS | Mod: QW,S$GLB,, | Performed by: FAMILY MEDICINE

## 2021-04-15 PROCEDURE — 99214 OFFICE O/P EST MOD 30 MIN: CPT | Mod: S$GLB,,, | Performed by: FAMILY MEDICINE

## 2021-04-15 PROCEDURE — U0002 COVID-19 LAB TEST NON-CDC: HCPCS | Mod: QW,S$GLB,, | Performed by: FAMILY MEDICINE

## 2021-04-15 RX ORDER — CEFDINIR 300 MG/1
300 CAPSULE ORAL 2 TIMES DAILY
Qty: 20 CAPSULE | Refills: 0 | Status: SHIPPED | OUTPATIENT
Start: 2021-04-15 | End: 2021-04-15 | Stop reason: SDUPTHER

## 2021-04-15 RX ORDER — DEXTROMETHORPHAN HBR, GUAIFENESIN AND PSEUDOEPHEDRINE HCL 60; 380; 20 MG/1; MG/1; MG/1
1 TABLET ORAL EVERY 6 HOURS PRN
Qty: 20 TABLET | Refills: 0 | Status: SHIPPED | OUTPATIENT
Start: 2021-04-15

## 2021-04-15 RX ORDER — NAPROXEN 375 MG/1
375 TABLET ORAL 2 TIMES DAILY
Qty: 20 TABLET | Refills: 0 | Status: SHIPPED | OUTPATIENT
Start: 2021-04-15

## 2021-04-15 RX ORDER — CEFDINIR 300 MG/1
300 CAPSULE ORAL 2 TIMES DAILY
Qty: 20 CAPSULE | Refills: 0 | Status: SHIPPED | OUTPATIENT
Start: 2021-04-15 | End: 2021-04-25

## 2021-04-16 ENCOUNTER — TELEPHONE (OUTPATIENT)
Dept: URGENT CARE | Facility: CLINIC | Age: 46
End: 2021-04-16

## 2021-05-06 ENCOUNTER — PATIENT MESSAGE (OUTPATIENT)
Dept: RESEARCH | Facility: HOSPITAL | Age: 46
End: 2021-05-06

## 2021-05-10 ENCOUNTER — PATIENT MESSAGE (OUTPATIENT)
Dept: RESEARCH | Facility: HOSPITAL | Age: 46
End: 2021-05-10

## 2021-06-05 ENCOUNTER — HOSPITAL ENCOUNTER (EMERGENCY)
Facility: HOSPITAL | Age: 46
Discharge: HOME | End: 2021-06-06
Attending: EMERGENCY MEDICINE
Payer: MEDICAID

## 2021-06-05 VITALS
TEMPERATURE: 98 F | SYSTOLIC BLOOD PRESSURE: 121 MMHG | BODY MASS INDEX: 25.66 KG/M2 | OXYGEN SATURATION: 100 % | RESPIRATION RATE: 20 BRPM | HEART RATE: 83 BPM | WEIGHT: 135.81 LBS | DIASTOLIC BLOOD PRESSURE: 66 MMHG

## 2021-06-05 DIAGNOSIS — R55 VASOVAGAL NEAR SYNCOPE: Primary | ICD-10-CM

## 2021-06-05 DIAGNOSIS — I49.9 IRREGULAR HEART BEAT: ICD-10-CM

## 2021-06-05 DIAGNOSIS — R42 DIZZY: ICD-10-CM

## 2021-06-05 PROCEDURE — 99283 EMERGENCY DEPT VISIT LOW MDM: CPT | Mod: 25

## 2021-06-05 PROCEDURE — 93010 EKG 12-LEAD: ICD-10-PCS | Mod: ,,, | Performed by: INTERNAL MEDICINE

## 2021-06-05 PROCEDURE — 93010 ELECTROCARDIOGRAM REPORT: CPT | Mod: ,,, | Performed by: INTERNAL MEDICINE

## 2021-06-05 PROCEDURE — 93005 ELECTROCARDIOGRAM TRACING: CPT

## 2021-06-24 ENCOUNTER — OFFICE VISIT (OUTPATIENT)
Dept: URGENT CARE | Facility: CLINIC | Age: 46
End: 2021-06-24
Payer: MEDICAID

## 2021-06-24 VITALS
BODY MASS INDEX: 25.49 KG/M2 | WEIGHT: 135 LBS | SYSTOLIC BLOOD PRESSURE: 136 MMHG | TEMPERATURE: 98 F | OXYGEN SATURATION: 99 % | DIASTOLIC BLOOD PRESSURE: 73 MMHG | RESPIRATION RATE: 18 BRPM | HEIGHT: 61 IN | HEART RATE: 76 BPM

## 2021-06-24 DIAGNOSIS — R05.9 COUGH: ICD-10-CM

## 2021-06-24 DIAGNOSIS — J20.8 ACUTE BACTERIAL BRONCHITIS: Primary | ICD-10-CM

## 2021-06-24 DIAGNOSIS — B96.89 ACUTE BACTERIAL BRONCHITIS: Primary | ICD-10-CM

## 2021-06-24 LAB
CTP QC/QA: YES
SARS-COV-2 RDRP RESP QL NAA+PROBE: NEGATIVE

## 2021-06-24 PROCEDURE — U0002: ICD-10-PCS | Mod: QW,S$GLB,, | Performed by: PHYSICIAN ASSISTANT

## 2021-06-24 PROCEDURE — 99214 PR OFFICE/OUTPT VISIT, EST, LEVL IV, 30-39 MIN: ICD-10-PCS | Mod: S$GLB,,, | Performed by: PHYSICIAN ASSISTANT

## 2021-06-24 PROCEDURE — U0002 COVID-19 LAB TEST NON-CDC: HCPCS | Mod: QW,S$GLB,, | Performed by: PHYSICIAN ASSISTANT

## 2021-06-24 PROCEDURE — 99214 OFFICE O/P EST MOD 30 MIN: CPT | Mod: S$GLB,,, | Performed by: PHYSICIAN ASSISTANT

## 2021-06-24 RX ORDER — ALBUTEROL SULFATE 90 UG/1
2 AEROSOL, METERED RESPIRATORY (INHALATION) EVERY 6 HOURS PRN
Qty: 18 G | Refills: 0 | Status: SHIPPED | OUTPATIENT
Start: 2021-06-24 | End: 2022-06-24

## 2021-06-24 RX ORDER — PREDNISONE 20 MG/1
20 TABLET ORAL 2 TIMES DAILY
Qty: 8 TABLET | Refills: 0 | Status: SHIPPED | OUTPATIENT
Start: 2021-06-24 | End: 2021-06-28

## 2021-06-24 RX ORDER — CODEINE PHOSPHATE AND GUAIFENESIN 10; 100 MG/5ML; MG/5ML
10 SOLUTION ORAL NIGHTLY PRN
Qty: 118 ML | Refills: 0 | Status: SHIPPED | OUTPATIENT
Start: 2021-06-24 | End: 2021-06-27

## 2021-06-24 RX ORDER — DOXYCYCLINE 100 MG/1
100 CAPSULE ORAL 2 TIMES DAILY
Qty: 14 CAPSULE | Refills: 0 | Status: SHIPPED | OUTPATIENT
Start: 2021-06-24 | End: 2021-07-01

## 2022-01-17 ENCOUNTER — HISTORICAL (OUTPATIENT)
Dept: ADMINISTRATIVE | Facility: HOSPITAL | Age: 47
End: 2022-01-17

## 2022-01-17 LAB — SARS-COV-2 RNA RESP QL NAA+PROBE: POSITIVE

## 2022-04-11 ENCOUNTER — HISTORICAL (OUTPATIENT)
Dept: ADMINISTRATIVE | Facility: HOSPITAL | Age: 47
End: 2022-04-11
Payer: MEDICAID

## 2022-04-29 VITALS
OXYGEN SATURATION: 98 % | WEIGHT: 133.38 LBS | BODY MASS INDEX: 26.89 KG/M2 | SYSTOLIC BLOOD PRESSURE: 107 MMHG | HEIGHT: 59 IN | DIASTOLIC BLOOD PRESSURE: 74 MMHG

## 2023-07-07 PROBLEM — Z17.1 MALIGNANT NEOPLASM OF UPPER-OUTER QUADRANT OF LEFT BREAST IN FEMALE, ESTROGEN RECEPTOR NEGATIVE: Status: ACTIVE | Noted: 2023-07-07

## 2023-07-07 PROBLEM — C50.412 MALIGNANT NEOPLASM OF UPPER-OUTER QUADRANT OF LEFT BREAST IN FEMALE, ESTROGEN RECEPTOR NEGATIVE: Status: ACTIVE | Noted: 2023-07-07

## 2023-07-14 ENCOUNTER — HOSPITAL ENCOUNTER (EMERGENCY)
Facility: HOSPITAL | Age: 48
Discharge: HOME | End: 2023-07-14
Attending: STUDENT IN AN ORGANIZED HEALTH CARE EDUCATION/TRAINING PROGRAM
Payer: MEDICAID

## 2023-07-14 VITALS
DIASTOLIC BLOOD PRESSURE: 67 MMHG | BODY MASS INDEX: 26.66 KG/M2 | RESPIRATION RATE: 21 BRPM | HEART RATE: 75 BPM | OXYGEN SATURATION: 96 % | WEIGHT: 160 LBS | HEIGHT: 65 IN | TEMPERATURE: 98 F | SYSTOLIC BLOOD PRESSURE: 114 MMHG

## 2023-07-14 DIAGNOSIS — R07.9 ACUTE CHEST PAIN: ICD-10-CM

## 2023-07-14 DIAGNOSIS — J06.9 VIRAL URI WITH COUGH: Primary | ICD-10-CM

## 2023-07-14 LAB
ALBUMIN SERPL BCP-MCNC: 4.1 G/DL (ref 3.5–5.2)
ALP SERPL-CCNC: 91 U/L (ref 55–135)
ALT SERPL W/O P-5'-P-CCNC: 33 U/L (ref 10–44)
ANION GAP SERPL CALC-SCNC: 12 MMOL/L (ref 8–16)
AST SERPL-CCNC: 23 U/L (ref 10–40)
B-HCG UR QL: NEGATIVE
BASOPHILS # BLD AUTO: 0.08 K/UL (ref 0–0.2)
BASOPHILS NFR BLD: 1 % (ref 0–1.9)
BILIRUB SERPL-MCNC: 0.6 MG/DL (ref 0.1–1)
BNP SERPL-MCNC: 14 PG/ML (ref 0–99)
BUN SERPL-MCNC: 13 MG/DL (ref 6–20)
CALCIUM SERPL-MCNC: 9.5 MG/DL (ref 8.7–10.5)
CHLORIDE SERPL-SCNC: 107 MMOL/L (ref 95–110)
CO2 SERPL-SCNC: 23 MMOL/L (ref 23–29)
CREAT SERPL-MCNC: 0.8 MG/DL (ref 0.5–1.4)
D DIMER PPP IA.FEU-MCNC: 0.27 MG/L FEU
DIFFERENTIAL METHOD: ABNORMAL
EOSINOPHIL # BLD AUTO: 0.6 K/UL (ref 0–0.5)
EOSINOPHIL NFR BLD: 7.7 % (ref 0–8)
ERYTHROCYTE [DISTWIDTH] IN BLOOD BY AUTOMATED COUNT: 13.7 % (ref 11.5–14.5)
EST. GFR  (NO RACE VARIABLE): >60 ML/MIN/1.73 M^2
GLUCOSE SERPL-MCNC: 104 MG/DL (ref 70–110)
HCT VFR BLD AUTO: 41.5 % (ref 37–48.5)
HGB BLD-MCNC: 13.5 G/DL (ref 12–16)
IMM GRANULOCYTES # BLD AUTO: 0.05 K/UL (ref 0–0.04)
IMM GRANULOCYTES NFR BLD AUTO: 0.6 % (ref 0–0.5)
LYMPHOCYTES # BLD AUTO: 2.2 K/UL (ref 1–4.8)
LYMPHOCYTES NFR BLD: 27.6 % (ref 18–48)
MCH RBC QN AUTO: 29.1 PG (ref 27–31)
MCHC RBC AUTO-ENTMCNC: 32.5 G/DL (ref 32–36)
MCV RBC AUTO: 89 FL (ref 82–98)
MONOCYTES # BLD AUTO: 0.7 K/UL (ref 0.3–1)
MONOCYTES NFR BLD: 8.3 % (ref 4–15)
NEUTROPHILS # BLD AUTO: 4.4 K/UL (ref 1.8–7.7)
NEUTROPHILS NFR BLD: 54.8 % (ref 38–73)
NRBC BLD-RTO: 0 /100 WBC
PLATELET # BLD AUTO: 276 K/UL (ref 150–450)
PMV BLD AUTO: 10.1 FL (ref 9.2–12.9)
POTASSIUM SERPL-SCNC: 3.8 MMOL/L (ref 3.5–5.1)
PROT SERPL-MCNC: 7.1 G/DL (ref 6–8.4)
RBC # BLD AUTO: 4.64 M/UL (ref 4–5.4)
SODIUM SERPL-SCNC: 142 MMOL/L (ref 136–145)
TROPONIN I SERPL DL<=0.01 NG/ML-MCNC: <0.006 NG/ML (ref 0–0.03)
WBC # BLD AUTO: 8.04 K/UL (ref 3.9–12.7)

## 2023-07-14 PROCEDURE — 25000003 PHARM REV CODE 250: Performed by: STUDENT IN AN ORGANIZED HEALTH CARE EDUCATION/TRAINING PROGRAM

## 2023-07-14 PROCEDURE — 93005 ELECTROCARDIOGRAM TRACING: CPT

## 2023-07-14 PROCEDURE — 85025 COMPLETE CBC W/AUTO DIFF WBC: CPT | Performed by: STUDENT IN AN ORGANIZED HEALTH CARE EDUCATION/TRAINING PROGRAM

## 2023-07-14 PROCEDURE — 93010 EKG 12-LEAD: ICD-10-PCS | Mod: ,,, | Performed by: INTERNAL MEDICINE

## 2023-07-14 PROCEDURE — 80053 COMPREHEN METABOLIC PANEL: CPT | Performed by: STUDENT IN AN ORGANIZED HEALTH CARE EDUCATION/TRAINING PROGRAM

## 2023-07-14 PROCEDURE — 93010 ELECTROCARDIOGRAM REPORT: CPT | Mod: ,,, | Performed by: INTERNAL MEDICINE

## 2023-07-14 PROCEDURE — 83880 ASSAY OF NATRIURETIC PEPTIDE: CPT | Performed by: STUDENT IN AN ORGANIZED HEALTH CARE EDUCATION/TRAINING PROGRAM

## 2023-07-14 PROCEDURE — 36415 COLL VENOUS BLD VENIPUNCTURE: CPT | Performed by: STUDENT IN AN ORGANIZED HEALTH CARE EDUCATION/TRAINING PROGRAM

## 2023-07-14 PROCEDURE — 99285 EMERGENCY DEPT VISIT HI MDM: CPT | Mod: 25

## 2023-07-14 PROCEDURE — 85379 FIBRIN DEGRADATION QUANT: CPT | Performed by: STUDENT IN AN ORGANIZED HEALTH CARE EDUCATION/TRAINING PROGRAM

## 2023-07-14 PROCEDURE — 84484 ASSAY OF TROPONIN QUANT: CPT | Performed by: STUDENT IN AN ORGANIZED HEALTH CARE EDUCATION/TRAINING PROGRAM

## 2023-07-14 PROCEDURE — 81025 URINE PREGNANCY TEST: CPT | Performed by: STUDENT IN AN ORGANIZED HEALTH CARE EDUCATION/TRAINING PROGRAM

## 2023-07-14 RX ORDER — ASPIRIN 325 MG
325 TABLET ORAL
Status: COMPLETED | OUTPATIENT
Start: 2023-07-14 | End: 2023-07-14

## 2023-07-14 RX ORDER — MAG HYDROX/ALUMINUM HYD/SIMETH 200-200-20
10 SUSPENSION, ORAL (FINAL DOSE FORM) ORAL
Status: COMPLETED | OUTPATIENT
Start: 2023-07-14 | End: 2023-07-14

## 2023-07-14 RX ADMIN — ALUMINUM HYDROXIDE, MAGNESIUM HYDROXIDE, AND DIMETHICONE 10 ML: 200; 20; 200 SUSPENSION ORAL at 04:07

## 2023-07-14 RX ADMIN — ASPIRIN 325 MG ORAL TABLET 325 MG: 325 PILL ORAL at 04:07

## 2023-07-14 NOTE — ED PROVIDER NOTES
Encounter Date: 2023       History     Chief Complaint   Patient presents with    Chest Pain     Patient to ER CC of chest pain for 2 weeks, describes it as a burning in her chest, also reports feeling short winded, states she went to urgent care and was instructed to come into the ER     47 year old female with a PMHx of triple negative breast cancer (treated, in remission), PE (no longer on anticoagulation), anxiety presents to the ED with family with chest pain, shortness of breath. Symptoms have been going on for 2 weeks. She's had a dry cough. Feels chest congestion. Chest pain is mid sternal, right sided. Constant achy, nothing worsens or improves the pain. Went to  where they had a negative COVID19 swab but couldn't x-ray her and with her chest pain, sent her to the ED. Mom has a history of angina but no stents, open heart surgery. No recent travels, hospitalizations, traumas, surgeries.       Review of patient's allergies indicates:   Allergen Reactions    Sulfa (sulfonamide antibiotics) Hives    Bactrim [sulfamethoxazole-trimethoprim] Rash     Past Medical History:   Diagnosis Date    Abnormal Pap smear of cervix     ASCUS +HPV    Anxiety     Breast CA 2017    stage 3    Depression     Encounter for prophylactic removal of fallopian tube     Hx of psychiatric care     Malignant neoplasm of upper-outer quadrant of left breast in female, estrogen receptor negative 2023    Neuropathy     Psychiatric problem     Pulmonary embolism     Sleep difficulties      Past Surgical History:   Procedure Laterality Date    BREAST SURGERY       SECTION      fallopian tube removal      infusaport      LYMPHADENECTOMY Left     masectomy      MASTECTOMY Left      Family History   Problem Relation Age of Onset    Anxiety disorder Mother     Breast cancer Neg Hx     Colon cancer Neg Hx     Ovarian cancer Neg Hx      Social History     Tobacco Use    Smoking status: Never    Smokeless tobacco: Never    Substance Use Topics    Alcohol use: No    Drug use: No     Review of Systems   Constitutional:  Negative for chills and fever.   HENT:  Negative for congestion, rhinorrhea and sneezing.    Eyes:  Negative for discharge and redness.   Respiratory:  Positive for cough and shortness of breath.    Cardiovascular:  Positive for chest pain. Negative for palpitations.   Gastrointestinal:  Negative for abdominal pain, diarrhea, nausea and vomiting.   Genitourinary:  Negative for dysuria, frequency, vaginal bleeding and vaginal discharge.   Musculoskeletal:  Negative for back pain and neck pain.   Skin:  Negative for rash and wound.   Neurological:  Negative for weakness, numbness and headaches.     Physical Exam     Initial Vitals   BP Pulse Resp Temp SpO2   07/14/23 1604 07/14/23 1604 07/14/23 1604 07/14/23 1614 07/14/23 1604   (!) 140/77 87 18 98.4 °F (36.9 °C) 98 %      MAP       --                Physical Exam    Nursing note and vitals reviewed.  Constitutional: She appears well-developed. She is not diaphoretic. No distress.   HENT:   Head: Normocephalic and atraumatic.   Right Ear: External ear normal.   Left Ear: External ear normal.   Eyes: Right eye exhibits no discharge. Left eye exhibits no discharge. No scleral icterus.   Neck: Neck supple.   Cardiovascular:  Normal rate and regular rhythm.           Pulmonary/Chest: Breath sounds normal. No stridor. No respiratory distress. She has no wheezes. She has no rhonchi. She has no rales.   Abdominal: Abdomen is soft. There is no abdominal tenderness. There is no guarding.   Musculoskeletal:         General: No edema.      Cervical back: Neck supple.     Neurological: She is alert and oriented to person, place, and time.   Skin: Skin is warm and dry. Capillary refill takes less than 2 seconds.   Psychiatric: She has a normal mood and affect.       ED Course   Procedures  Labs Reviewed   CBC W/ AUTO DIFFERENTIAL - Abnormal; Notable for the following components:        Result Value    Immature Granulocytes 0.6 (*)     Immature Grans (Abs) 0.05 (*)     Eos # 0.6 (*)     All other components within normal limits   COMPREHENSIVE METABOLIC PANEL   TROPONIN I   B-TYPE NATRIURETIC PEPTIDE   D DIMER, QUANTITATIVE   PREGNANCY TEST, URINE RAPID    Narrative:     Specimen Source->Urine     EKG Readings: (Independently Interpreted)   Previous EKG: Compared with most recent EKG Previous EKG Date: 6/5/2021.   NSR at 80 bpm. Normal axis. Normal intervals. Nonspecific T wave abnormalities. No STEMI.     Imaging Results              X-Ray Chest AP Portable (Final result)  Result time 07/14/23 16:54:08      Final result by Yisel Nava MD (07/14/23 16:54:08)                   Impression:      No acute cardiopulmonary abnormality appreciated radiographically.  No interval detrimental change in the radiographic appearance of the chest when compared to the previous study.      Electronically signed by: Wander Nava MD  Date:    07/14/2023  Time:    16:54               Narrative:    EXAMINATION:  XR CHEST AP PORTABLE    CLINICAL HISTORY:  Chest Pain;    TECHNIQUE:  A single portable AP chest radiograph was acquired.    COMPARISON:  Chest x-ray-12/15/2020    FINDINGS:  There are surgical clips present in the left axilla.  The cardiomediastinal silhouette is normal in appearance.  No pulmonary vascular congestion appreciated. No airspace consolidation or pulmonary mass. No significant volume of pleural fluid or pneumothorax. The bones are unremarkable for age.                                       Medications   aspirin tablet 325 mg (325 mg Oral Given 7/14/23 1632)   aluminum-magnesium hydroxide-simethicone 200-200-20 mg/5 mL suspension 10 mL (10 mLs Oral Given 7/14/23 1632)     Medical Decision Making:   Differential Diagnosis:   Differential considerations include (in no particular order): ACS, PE, CHF, COPD, Pneumothorax, Asthma, Pneumonia, Anemia, COVID-19  ED Management:  Based on the  patient's evaluation - patient appears well for discharge home. Negative CXR, d-dimer, BNP, trop, ECG. Likely viral URI. Already has rx from  for Abx, cough medicine. Will discharge home with supportive care. Patient is in agreement.                        Clinical Impression:   Final diagnoses:  [R07.9] Acute chest pain  [J06.9] Viral URI with cough (Primary)        ED Disposition Condition    Discharge Stable          ED Prescriptions    None       Follow-up Information       Follow up With Specialties Details Why Contact Info    Ibeth Camacho, NP Family Medicine Schedule an appointment as soon as possible for a visit in 1 week  [address] [facility]  [place] [address]  [phone]               Danilo Interiano DO  07/14/23 5069

## 2023-07-26 ENCOUNTER — TELEPHONE (OUTPATIENT)
Dept: FAMILY MEDICINE | Facility: CLINIC | Age: 48
End: 2023-07-26
Payer: MEDICAID

## 2023-07-26 ENCOUNTER — TELEPHONE (OUTPATIENT)
Dept: FAMILY MEDICINE | Facility: CLINIC | Age: 48
End: 2023-07-26

## 2023-07-26 NOTE — TELEPHONE ENCOUNTER
----- Message from Naseem Saumels MA sent at 2023  1:24 PM CDT -----  Val Treviño  MRN: [id number]  : 1975  PCP: Ibeth Camacho  Home Phone      [phone]  Work Phone      Not on file.  Mobile          [phone]  Home Phone      [phone]  Mobile          [phone]      MESSAGE:     Pt requesting an rafal     Please advise

## 2023-07-26 NOTE — TELEPHONE ENCOUNTER
----- Message from Gareth Quezada sent at 2023 12:22 PM CDT -----  Contact: Patient  Val Treviño  MRN: [id number]  : 1975  PCP: Ibeth Camacho  Home Phone      [phone]  Work Phone      Not on file.  Mobile          [phone]  Home Phone      [phone]  Mobile          [phone]      MESSAGE: new patient -- requesting to est care with Dr Garland -- please call to schedule    Call [phone]    PCP: ?????

## 2023-09-20 NOTE — PROGRESS NOTES
11/16/18 1030   [facility]   Group Name Leisure Skills Training   Specific Interventions Cognitive Stimulation Training   Participation Level Active   Participation Quality Cooperative;Social   Insight/Motivation Applies New Skills;Good   Affect/Mood Display Appropriate   Cognition Alert   Psychomotor WNL   Patient remained attentive, learned new leisure activity, made decisions, brainstorm with peers to answer questions.   No

## 2024-02-05 DIAGNOSIS — C50.412 MALIGNANT NEOPLASM OF UPPER-OUTER QUADRANT OF LEFT BREAST IN FEMALE, ESTROGEN RECEPTOR NEGATIVE: Primary | ICD-10-CM

## 2024-02-05 DIAGNOSIS — Z17.1 MALIGNANT NEOPLASM OF UPPER-OUTER QUADRANT OF LEFT BREAST IN FEMALE, ESTROGEN RECEPTOR NEGATIVE: Primary | ICD-10-CM

## 2024-03-02 DIAGNOSIS — C50.412 MALIGNANT NEOPLASM OF UPPER-OUTER QUADRANT OF LEFT BREAST IN FEMALE, ESTROGEN RECEPTOR NEGATIVE: Primary | ICD-10-CM

## 2024-03-02 DIAGNOSIS — Z17.1 MALIGNANT NEOPLASM OF UPPER-OUTER QUADRANT OF LEFT BREAST IN FEMALE, ESTROGEN RECEPTOR NEGATIVE: Primary | ICD-10-CM

## 2024-03-11 ENCOUNTER — TELEPHONE (OUTPATIENT)
Dept: SURGERY | Facility: CLINIC | Age: 49
End: 2024-03-11
Payer: COMMERCIAL

## 2024-03-11 NOTE — TELEPHONE ENCOUNTER
----- Message from Connie Holland sent at 3/11/2024  8:38 AM CDT -----  States she needs to reschedule her appt that is on 3/18. Please call pt [phone]. Thank you

## 2024-04-07 DIAGNOSIS — C50.412 MALIGNANT NEOPLASM OF UPPER-OUTER QUADRANT OF LEFT BREAST IN FEMALE, ESTROGEN RECEPTOR NEGATIVE: Primary | ICD-10-CM

## 2024-04-07 DIAGNOSIS — Z17.1 MALIGNANT NEOPLASM OF UPPER-OUTER QUADRANT OF LEFT BREAST IN FEMALE, ESTROGEN RECEPTOR NEGATIVE: Primary | ICD-10-CM

## 2024-04-15 PROBLEM — Z17.1 ESTROGEN RECEPTOR NEGATIVE STATUS (ER-): Status: ACTIVE | Noted: 2024-04-15

## 2024-07-09 ENCOUNTER — TELEPHONE (OUTPATIENT)
Dept: PSYCHIATRY | Facility: CLINIC | Age: 49
End: 2024-07-09
Payer: COMMERCIAL

## 2024-07-09 NOTE — TELEPHONE ENCOUNTER
----- Message from Dayana Mccracken sent at 2024  1:22 PM CDT -----  Contact: PATIENT  Val rTeviño  MRN: [id number]  : 1975  PCP: Ibeth Camacho  Home Phone      [phone]  Work Phone      Not on file.  Mobile          [phone]  Home Phone      [phone]  Mobile          [phone]      MESSAGE: Patient would like to make an appointment for the treatment of Panic Disorder, PTSD & binge eating.          Phone: [phone]

## 2024-10-13 ENCOUNTER — HOSPITAL ENCOUNTER (EMERGENCY)
Facility: HOSPITAL | Age: 49
Discharge: HOME | End: 2024-10-13
Attending: SURGERY
Payer: COMMERCIAL

## 2024-10-13 VITALS
BODY MASS INDEX: 28.72 KG/M2 | SYSTOLIC BLOOD PRESSURE: 141 MMHG | TEMPERATURE: 98 F | WEIGHT: 172.38 LBS | DIASTOLIC BLOOD PRESSURE: 81 MMHG | OXYGEN SATURATION: 98 % | HEIGHT: 65 IN | RESPIRATION RATE: 18 BRPM | HEART RATE: 70 BPM

## 2024-10-13 DIAGNOSIS — R10.13 EPIGASTRIC PAIN: ICD-10-CM

## 2024-10-13 LAB
ALBUMIN SERPL BCP-MCNC: 3.8 G/DL (ref 3.5–5.2)
ALP SERPL-CCNC: 101 U/L (ref 55–135)
ALT SERPL W/O P-5'-P-CCNC: 28 U/L (ref 10–44)
AMORPH CRY URNS QL MICRO: ABNORMAL
ANION GAP SERPL CALC-SCNC: 12 MMOL/L (ref 8–16)
AST SERPL-CCNC: 21 U/L (ref 10–40)
B-HCG UR QL: NEGATIVE
BACTERIA #/AREA URNS HPF: ABNORMAL /HPF
BASOPHILS # BLD AUTO: 0.05 K/UL (ref 0–0.2)
BASOPHILS NFR BLD: 0.7 % (ref 0–1.9)
BILIRUB SERPL-MCNC: 0.4 MG/DL (ref 0.1–1)
BILIRUB UR QL STRIP: NEGATIVE
BUN SERPL-MCNC: 11 MG/DL (ref 6–20)
CALCIUM SERPL-MCNC: 9 MG/DL (ref 8.7–10.5)
CHLORIDE SERPL-SCNC: 107 MMOL/L (ref 95–110)
CLARITY UR: ABNORMAL
CO2 SERPL-SCNC: 24 MMOL/L (ref 23–29)
COLOR UR: YELLOW
CREAT SERPL-MCNC: 0.7 MG/DL (ref 0.5–1.4)
DIFFERENTIAL METHOD BLD: NORMAL
EOSINOPHIL # BLD AUTO: 0.4 K/UL (ref 0–0.5)
EOSINOPHIL NFR BLD: 4.9 % (ref 0–8)
ERYTHROCYTE [DISTWIDTH] IN BLOOD BY AUTOMATED COUNT: 13.3 % (ref 11.5–14.5)
EST. GFR  (NO RACE VARIABLE): >60 ML/MIN/1.73 M^2
GLUCOSE SERPL-MCNC: 111 MG/DL (ref 70–110)
GLUCOSE UR QL STRIP: NEGATIVE
HCT VFR BLD AUTO: 40.1 % (ref 37–48.5)
HGB BLD-MCNC: 13.7 G/DL (ref 12–16)
HGB UR QL STRIP: NEGATIVE
IMM GRANULOCYTES # BLD AUTO: 0.03 K/UL (ref 0–0.04)
IMM GRANULOCYTES NFR BLD AUTO: 0.4 % (ref 0–0.5)
INR PPP: 1 (ref 0.8–1.2)
KETONES UR QL STRIP: NEGATIVE
LEUKOCYTE ESTERASE UR QL STRIP: NEGATIVE
LYMPHOCYTES # BLD AUTO: 2.4 K/UL (ref 1–4.8)
LYMPHOCYTES NFR BLD: 33.5 % (ref 18–48)
MCH RBC QN AUTO: 29.5 PG (ref 27–31)
MCHC RBC AUTO-ENTMCNC: 34.2 G/DL (ref 32–36)
MCV RBC AUTO: 86 FL (ref 82–98)
MICROSCOPIC COMMENT: ABNORMAL
MONOCYTES # BLD AUTO: 0.6 K/UL (ref 0.3–1)
MONOCYTES NFR BLD: 8.6 % (ref 4–15)
NEUTROPHILS # BLD AUTO: 3.7 K/UL (ref 1.8–7.7)
NEUTROPHILS NFR BLD: 51.9 % (ref 38–73)
NITRITE UR QL STRIP: NEGATIVE
NRBC BLD-RTO: 0 /100 WBC
PH UR STRIP: >8 [PH] (ref 5–8)
PLATELET # BLD AUTO: 259 K/UL (ref 150–450)
PMV BLD AUTO: 10.4 FL (ref 9.2–12.9)
POTASSIUM SERPL-SCNC: 3.6 MMOL/L (ref 3.5–5.1)
PROT SERPL-MCNC: 6.7 G/DL (ref 6–8.4)
PROT UR QL STRIP: NEGATIVE
PROTHROMBIN TIME: 10.8 SEC (ref 9–12.5)
RBC # BLD AUTO: 4.64 M/UL (ref 4–5.4)
SODIUM SERPL-SCNC: 143 MMOL/L (ref 136–145)
SP GR UR STRIP: 1.02 (ref 1–1.03)
SQUAMOUS #/AREA URNS HPF: 1 /HPF
TROPONIN I SERPL DL<=0.01 NG/ML-MCNC: <0.006 NG/ML (ref 0–0.03)
URN SPEC COLLECT METH UR: ABNORMAL
UROBILINOGEN UR STRIP-ACNC: NEGATIVE EU/DL
WBC # BLD AUTO: 7.17 K/UL (ref 3.9–12.7)
WBC #/AREA URNS HPF: 2 /HPF (ref 0–5)

## 2024-10-13 PROCEDURE — 96374 THER/PROPH/DIAG INJ IV PUSH: CPT

## 2024-10-13 PROCEDURE — 81000 URINALYSIS NONAUTO W/SCOPE: CPT | Performed by: SURGERY

## 2024-10-13 PROCEDURE — 81025 URINE PREGNANCY TEST: CPT | Performed by: SURGERY

## 2024-10-13 PROCEDURE — 96375 TX/PRO/DX INJ NEW DRUG ADDON: CPT

## 2024-10-13 PROCEDURE — 99285 EMERGENCY DEPT VISIT HI MDM: CPT | Mod: 25

## 2024-10-13 PROCEDURE — 99900035 HC TECH TIME PER 15 MIN (STAT)

## 2024-10-13 PROCEDURE — 82150 ASSAY OF AMYLASE: CPT | Performed by: SURGERY

## 2024-10-13 PROCEDURE — 63600175 PHARM REV CODE 636 W HCPCS: Performed by: SURGERY

## 2024-10-13 PROCEDURE — 93005 ELECTROCARDIOGRAM TRACING: CPT

## 2024-10-13 PROCEDURE — 85025 COMPLETE CBC W/AUTO DIFF WBC: CPT | Performed by: SURGERY

## 2024-10-13 PROCEDURE — 93010 ELECTROCARDIOGRAM REPORT: CPT | Mod: ,,, | Performed by: INTERNAL MEDICINE

## 2024-10-13 PROCEDURE — 25000003 PHARM REV CODE 250: Performed by: SURGERY

## 2024-10-13 PROCEDURE — 96361 HYDRATE IV INFUSION ADD-ON: CPT

## 2024-10-13 PROCEDURE — 25500020 PHARM REV CODE 255: Performed by: SURGERY

## 2024-10-13 PROCEDURE — 80053 COMPREHEN METABOLIC PANEL: CPT | Performed by: SURGERY

## 2024-10-13 PROCEDURE — 84484 ASSAY OF TROPONIN QUANT: CPT | Performed by: SURGERY

## 2024-10-13 PROCEDURE — 85610 PROTHROMBIN TIME: CPT | Performed by: SURGERY

## 2024-10-13 RX ORDER — PANTOPRAZOLE SODIUM 40 MG/1
40 TABLET, DELAYED RELEASE ORAL DAILY
Qty: 30 TABLET | Refills: 0 | Status: SHIPPED | OUTPATIENT
Start: 2024-10-13 | End: 2024-10-17

## 2024-10-13 RX ORDER — DICYCLOMINE HYDROCHLORIDE 20 MG/1
20 TABLET ORAL 4 TIMES DAILY PRN
Qty: 15 TABLET | Refills: 0 | Status: SHIPPED | OUTPATIENT
Start: 2024-10-13 | End: 2024-10-17

## 2024-10-13 RX ORDER — ONDANSETRON HYDROCHLORIDE 2 MG/ML
8 INJECTION, SOLUTION INTRAVENOUS
Status: COMPLETED | OUTPATIENT
Start: 2024-10-13 | End: 2024-10-13

## 2024-10-13 RX ORDER — ONDANSETRON 4 MG/1
4 TABLET, ORALLY DISINTEGRATING ORAL EVERY 8 HOURS PRN
Qty: 20 TABLET | Refills: 0 | Status: SHIPPED | OUTPATIENT
Start: 2024-10-13

## 2024-10-13 RX ORDER — FAMOTIDINE 10 MG/ML
20 INJECTION INTRAVENOUS
Status: COMPLETED | OUTPATIENT
Start: 2024-10-13 | End: 2024-10-13

## 2024-10-13 RX ORDER — MORPHINE SULFATE 2 MG/ML
4 INJECTION, SOLUTION INTRAMUSCULAR; INTRAVENOUS
Status: COMPLETED | OUTPATIENT
Start: 2024-10-13 | End: 2024-10-13

## 2024-10-13 RX ORDER — SUCRALFATE 1 G/1
1 TABLET ORAL 4 TIMES DAILY
Qty: 120 TABLET | Refills: 0 | Status: SHIPPED | OUTPATIENT
Start: 2024-10-13 | End: 2024-11-12

## 2024-10-13 RX ADMIN — ONDANSETRON 8 MG: 2 INJECTION INTRAMUSCULAR; INTRAVENOUS at 05:10

## 2024-10-13 RX ADMIN — SODIUM CHLORIDE 1000 ML: 9 INJECTION, SOLUTION INTRAVENOUS at 05:10

## 2024-10-13 RX ADMIN — FAMOTIDINE 20 MG: 10 INJECTION INTRAVENOUS at 05:10

## 2024-10-13 RX ADMIN — MORPHINE SULFATE 4 MG: 2 INJECTION, SOLUTION INTRAMUSCULAR; INTRAVENOUS at 05:10

## 2024-10-13 RX ADMIN — IOHEXOL 75 ML: 350 INJECTION, SOLUTION INTRAVENOUS at 06:10

## 2024-10-13 NOTE — ED PROVIDER NOTES
Encounter Date: 10/13/2024       History     Chief Complaint   Patient presents with    Abdominal Pain     [age] year female with epigastric pain for the last week and has been seen by her primary doctor and went to a nearby hospital 2 days ago for a checkup they have diagnosed her with gastritis but she still has pain there was no imaging done according to the patient and a review of the recent visits    The history is provided by the patient.   Abdominal Pain  The current episode started several days ago. The onset of the illness was gradual. The abdominal pain is located in the epigastric region. The abdominal pain does not radiate. The severity of the abdominal pain is 7/10. The abdominal pain is relieved by nothing.     Review of patient's allergies indicates:   Allergen Reactions    Prednisone Hallucinations    Sulfa (sulfonamide antibiotics) Hives    Bactrim [sulfamethoxazole-trimethoprim] Rash     Past Medical History:   Diagnosis Date    Abnormal Pap smear of cervix     ASCUS +HPV    Anxiety     Breast CA 2017    stage 3    Depression     Encounter for prophylactic removal of fallopian tube     Estrogen receptor negative status (ER-) 04/15/2024    Hx of psychiatric care     Left breast mass     Malignant neoplasm of upper-outer quadrant of left breast in female, estrogen receptor negative 2023    dx 2017    Neuropathy     Non-healing skin lesion     left lateral chest wall lesion    Panic disorder     Psychiatric problem     Pulmonary embolism     Sleep difficulties      Past Surgical History:   Procedure Laterality Date       Mediport insertion 3/14/17        modified radical mastectomy, left and Port Removal 2017       expander replaced        left breast and axillary LN core biopsy        Left lateral chest wall excision of skin lesion 19       SECTION      x2    fallopian tube removal      infusaport      Left breaset reconstruction Revision with Fat grafting  1/2021       Family History   Problem Relation Name Age of Onset    Anxiety disorder Mother      Breast cancer Neg Hx      Colon cancer Neg Hx      Ovarian cancer Neg Hx       Social History     Tobacco Use    Smoking status: Never    Smokeless tobacco: Never   Substance Use Topics    Alcohol use: No    Drug use: No     Review of Systems   Constitutional: Negative.    HENT: Negative.     Eyes: Negative.    Respiratory: Negative.     Cardiovascular: Negative.    Gastrointestinal:  Positive for abdominal pain.   Endocrine: Negative.    Genitourinary: Negative.    Skin: Negative.    Allergic/Immunologic: Negative.    Neurological: Negative.    Hematological: Negative.    Psychiatric/Behavioral: Negative.         Physical Exam     Initial Vitals [10/13/24 1638]   BP Pulse Resp Temp SpO2   (!) 167/93 95 16 97.9 °F (36.6 °C) 97 %      MAP       --         Physical Exam    Nursing note and vitals reviewed.  Constitutional: She appears well-developed and well-nourished.   HENT:   Head: Normocephalic.   Eyes: Conjunctivae are normal.   Neck:   Normal range of motion.  Cardiovascular:  Normal rate.           Pulmonary/Chest: Breath sounds normal.   Abdominal: Abdomen is soft.   Mild epigastric tenderness but no rebound   Musculoskeletal:         General: Normal range of motion.      Cervical back: Normal range of motion.     Neurological: She is alert and oriented to person, place, and time. GCS score is 15. GCS eye subscore is 4. GCS verbal subscore is 5. GCS motor subscore is 6.   Skin: Skin is warm.   Psychiatric: She has a normal mood and affect.         ED Course   Procedures  Labs Reviewed   COMPREHENSIVE METABOLIC PANEL - Abnormal       Result Value    Sodium 143      Potassium 3.6      Chloride 107      CO2 24      Glucose 111 (*)     BUN 11      Creatinine 0.7      Calcium 9.0      Total Protein 6.7      Albumin 3.8      Total Bilirubin 0.4      Alkaline Phosphatase 101      AST 21      ALT 28      eGFR >60       Anion Gap 12     URINALYSIS, REFLEX TO URINE CULTURE - Abnormal    Specimen UA Urine, Clean Catch      Color, UA Yellow      Appearance, UA Cloudy (*)     pH, UA >8.0      Specific Gravity, UA 1.020      Protein, UA Negative      Glucose, UA Negative      Ketones, UA Negative      Bilirubin (UA) Negative      Occult Blood UA Negative      Nitrite, UA Negative      Urobilinogen, UA Negative      Leukocytes, UA Negative      Narrative:     Specimen Source->Urine   URINALYSIS MICROSCOPIC - Abnormal    WBC, UA 2      Bacteria Rare      Squam Epithel, UA 1      Amorphous, UA Many (*)     Microscopic Comment SEE COMMENT      Narrative:     Specimen Source->Urine   PROTIME-INR    Prothrombin Time 10.8      INR 1.0     TROPONIN I    Troponin I <0.006     CBC W/ AUTO DIFFERENTIAL    WBC 7.17      RBC 4.64      Hemoglobin 13.7      Hematocrit 40.1      MCV 86      MCH 29.5      MCHC 34.2      RDW 13.3      Platelets 259      MPV 10.4      Immature Granulocytes 0.4      Gran # (ANC) 3.7      Immature Grans (Abs) 0.03      Lymph # 2.4      Mono # 0.6      Eos # 0.4      Baso # 0.05      nRBC 0      Gran % 51.9      Lymph % 33.5      Mono % 8.6      Eosinophil % 4.9      Basophil % 0.7      Differential Method Automated     PREGNANCY TEST, URINE RAPID    Preg Test, Ur Negative      Narrative:     Specimen Source->Urine   TROPONIN I   AMYLASE     EKG Readings: (Independently Interpreted)   Rhythm: Normal Sinus Rhythm. Heart Rate: 79. Ectopy: No Ectopy. Conduction: Normal.       Imaging Results              CT Abdomen Pelvis With IV Contrast NO Oral Contrast (Final result)  Result time 10/13/24 18:37:50      Final result by Yovani Leon MD (10/13/24 18:37:50)                   Impression:      Negative CT of the abdomen and pelvis.    Specifically, no evidence of pelvic mass or free fluid.      Electronically signed by: Yovani Leon  Date:    10/13/2024  Time:    18:37               Narrative:    EXAMINATION:  CT  ABDOMEN PELVIS WITH IV CONTRAST    CLINICAL HISTORY:  Rule out ruptured ectopic for nonviable fetus;    TECHNIQUE:  Low dose axial images, sagittal and coronal reformations were obtained from the lung bases to the pubic symphysis following the IV administration of 75 mL of Omnipaque 350 .  Oral contrast was not given.    COMPARISON:  None    FINDINGS:  Heart: Normal in size. No pericardial effusion.    Lung Bases: Well aerated, without consolidation or pleural fluid.    Liver: Normal in size and attenuation, with no focal hepatic lesions.    Gallbladder: No calcified gallstones.    Bile Ducts: No evidence of dilated ducts.    Pancreas: No mass or peripancreatic fat stranding.    Spleen: Unremarkable.    Adrenals: Unremarkable.    Kidneys/ Ureters: Unremarkable.    Bladder: No evidence of wall thickening.    Reproductive organs: IUD.  No pelvic mass or free fluid.    GI Tract/Mesentery: No evidence of bowel obstruction or inflammation.    Peritoneal Space: No ascites. No free air.    Retroperitoneum: No significant adenopathy.    Abdominal wall: Unremarkable.    Vasculature: No significant atherosclerosis or aneurysm.    Bones: No acute fracture.                                       Medications   sodium chloride 0.9% bolus 1,000 mL 1,000 mL (0 mLs Intravenous Stopped 10/13/24 1820)   morphine injection 4 mg (4 mg Intravenous Given 10/13/24 1721)   ondansetron injection 8 mg (8 mg Intravenous Given 10/13/24 1723)   famotidine (PF) injection 20 mg (20 mg Intravenous Given 10/13/24 1722)   iohexoL (OMNIPAQUE 350) injection 75 mL (75 mLs Intravenous Given 10/13/24 1807)     Medical Decision Making  Patient presents with epigastric pain and a diagnosis of gastritis from 2 visits at other healthcare providers last week but she continues to have pain differential diagnosis is gastritis versus cholecystitis    Problems Addressed:  Epigastric pain: complicated acute illness or injury    Amount and/or Complexity of Data  Reviewed  Labs: ordered. Decision-making details documented in ED Course.  Radiology: ordered and independent interpretation performed.    ED Management & Risks of Complication, Morbidity, & Mortality:  I took over this patient from the daytime physician with a 6:00 p.m. shift change  Longstanding issues with epigastric pain with several PCP & other evaluations  Patient was frustrated that the Pepcid was not working, came for 2nd opinion  Lab work normal, EKG normal, troponin normal, urinalysis normal on evaluation  CT of the abdomen & pelvis showed no acute findings in the emergency room  Will start patient on Protonix, Carafate, Bentyl, & Zofran on discharge  Patient has a appointment with [facility] Wednesday  Recommend expertise evaluation as well as endoscopy for definitive DX    Clinical Impression:  Final diagnoses:  [R10.13] Epigastric pain          ED Disposition Condition    Discharge Stable          ED Prescriptions       Medication Sig Dispense Start Date End Date Auth. Provider    pantoprazole (PROTONIX) 40 MG tablet Take 1 tablet (40 mg total) by mouth once daily. 30 tablet 10/13/2024 11/12/2024 Shahid Gordon MD    sucralfate (CARAFATE) 1 gram tablet Take 1 tablet (1 g total) by mouth 4 (four) times daily. 120 tablet 10/13/2024 11/12/2024 Shahid Gordon MD    ondansetron (ZOFRAN-ODT) 4 MG TbDL Take 1 tablet (4 mg total) by mouth every 8 (eight) hours as needed (nausea). 20 tablet 10/13/2024 -- Shahid Gordon MD    dicyclomine (BENTYL) 20 mg tablet Take 1 tablet (20 mg total) by mouth 4 (four) times daily as needed (CRAMPS). 15 tablet 10/13/2024 11/12/2024 Shahid Gordon MD          Follow-up Information       Follow up With Specialties Details Why Contact Info    Yovani Pa MD Gastroenterology Go in 3 days  [address]  [address]  [facility] [id number] [phone]               Shahid Gordon MD  10/13/24 9739

## 2024-10-13 NOTE — ED TRIAGE NOTES
49 y.o. female presents to ER Room/bed info not found   Chief Complaint   Patient presents with    Abdominal Pain   .   C/o upper abd pain since Thursday, seen by PCP and an ER but still with pain

## 2024-10-14 LAB
AMYLASE SERPL-CCNC: 82 U/L (ref 20–110)
OHS QRS DURATION: 76 MS
OHS QTC CALCULATION: 470 MS

## 2024-10-17 ENCOUNTER — TELEPHONE (OUTPATIENT)
Dept: SURGERY | Facility: CLINIC | Age: 49
End: 2024-10-17
Payer: COMMERCIAL

## 2024-10-17 NOTE — TELEPHONE ENCOUNTER
Returned pt's call to inquire about her last appt with visit with Dr. Farooq since there were no records of visits in her chart. Pt stated it was around 2018. This nurse suggested she contact Dr. Rodriguez's office or her Gyn since they had seen her most recently. Pt verbalized understanding.

## 2024-10-17 NOTE — TELEPHONE ENCOUNTER
----- Message from Med Assistant Jefferson sent at 10/17/2024  2:52 PM CDT -----  Contact: Val Neal stated that she needs a referral sent to [facility] for a breast surgeron. She stated she doesn't have a specific doctor, but they can't schedule her without a referral and records. Their fax number is [phone] and the number for the office is [phone]. The patient's call abck number is [phone]

## 2024-11-07 PROBLEM — C50.919 TRIPLE NEGATIVE BREAST CANCER: Status: ACTIVE | Noted: 2024-11-07

## 2024-11-07 PROBLEM — Z71.9 ENCOUNTER FOR HEALTH EDUCATION: Status: ACTIVE | Noted: 2024-11-07

## 2024-11-07 PROBLEM — Z17.421 TRIPLE NEGATIVE BREAST CANCER: Status: ACTIVE | Noted: 2024-11-07

## 2024-11-07 PROBLEM — Z71.89 ENCOUNTER TO DISCUSS TREATMENT OPTIONS: Status: ACTIVE | Noted: 2024-11-07

## 2024-11-07 PROBLEM — Z71.2 ENCOUNTER TO DISCUSS TEST RESULTS: Status: ACTIVE | Noted: 2024-11-07

## 2024-11-19 PROBLEM — R74.01 TRANSAMINITIS: Status: ACTIVE | Noted: 2024-11-19

## 2024-11-19 PROBLEM — M85.80 OSTEOPENIA: Status: ACTIVE | Noted: 2024-11-19

## 2025-03-03 ENCOUNTER — HOSPITAL ENCOUNTER (EMERGENCY)
Facility: HOSPITAL | Age: 50
Discharge: HOME | End: 2025-03-03
Attending: STUDENT IN AN ORGANIZED HEALTH CARE EDUCATION/TRAINING PROGRAM

## 2025-03-03 VITALS
RESPIRATION RATE: 18 BRPM | WEIGHT: 161.38 LBS | SYSTOLIC BLOOD PRESSURE: 122 MMHG | BODY MASS INDEX: 32.59 KG/M2 | OXYGEN SATURATION: 97 % | TEMPERATURE: 98 F | HEART RATE: 62 BPM | DIASTOLIC BLOOD PRESSURE: 78 MMHG

## 2025-03-03 DIAGNOSIS — R10.13 EPIGASTRIC PAIN: Primary | ICD-10-CM

## 2025-03-03 LAB
ALBUMIN SERPL BCP-MCNC: 4.2 G/DL (ref 3.5–5.2)
ALP SERPL-CCNC: 87 U/L (ref 40–150)
ALT SERPL W/O P-5'-P-CCNC: 26 U/L (ref 10–44)
ANION GAP SERPL CALC-SCNC: 9 MMOL/L (ref 8–16)
AST SERPL-CCNC: 24 U/L (ref 10–40)
BASOPHILS # BLD AUTO: 0.06 K/UL (ref 0–0.2)
BASOPHILS NFR BLD: 1.1 % (ref 0–1.9)
BILIRUB SERPL-MCNC: 0.8 MG/DL (ref 0.1–1)
BILIRUB UR QL STRIP: NEGATIVE
BUN SERPL-MCNC: 12 MG/DL (ref 6–20)
CALCIUM SERPL-MCNC: 8.9 MG/DL (ref 8.7–10.5)
CHLORIDE SERPL-SCNC: 106 MMOL/L (ref 95–110)
CLARITY UR: CLEAR
CO2 SERPL-SCNC: 27 MMOL/L (ref 23–29)
COLOR UR: YELLOW
CREAT SERPL-MCNC: 0.7 MG/DL (ref 0.5–1.4)
DIFFERENTIAL METHOD BLD: NORMAL
EOSINOPHIL # BLD AUTO: 0.2 K/UL (ref 0–0.5)
EOSINOPHIL NFR BLD: 4.2 % (ref 0–8)
ERYTHROCYTE [DISTWIDTH] IN BLOOD BY AUTOMATED COUNT: 13.2 % (ref 11.5–14.5)
EST. GFR  (NO RACE VARIABLE): >60 ML/MIN/1.73 M^2
GLUCOSE SERPL-MCNC: 95 MG/DL (ref 70–110)
GLUCOSE UR QL STRIP: NEGATIVE
HCT VFR BLD AUTO: 40.9 % (ref 37–48.5)
HGB BLD-MCNC: 13.9 G/DL (ref 12–16)
HGB UR QL STRIP: NEGATIVE
IMM GRANULOCYTES # BLD AUTO: 0.01 K/UL (ref 0–0.04)
IMM GRANULOCYTES NFR BLD AUTO: 0.2 % (ref 0–0.5)
KETONES UR QL STRIP: NEGATIVE
LEUKOCYTE ESTERASE UR QL STRIP: NEGATIVE
LIPASE SERPL-CCNC: 29 U/L (ref 4–60)
LYMPHOCYTES # BLD AUTO: 2.4 K/UL (ref 1–4.8)
LYMPHOCYTES NFR BLD: 42.5 % (ref 18–48)
MCH RBC QN AUTO: 29.2 PG (ref 27–31)
MCHC RBC AUTO-ENTMCNC: 34 G/DL (ref 32–36)
MCV RBC AUTO: 86 FL (ref 82–98)
MONOCYTES # BLD AUTO: 0.5 K/UL (ref 0.3–1)
MONOCYTES NFR BLD: 9 % (ref 4–15)
NEUTROPHILS # BLD AUTO: 2.4 K/UL (ref 1.8–7.7)
NEUTROPHILS NFR BLD: 43 % (ref 38–73)
NITRITE UR QL STRIP: NEGATIVE
NRBC BLD-RTO: 0 /100 WBC
OB PNL STL: NEGATIVE
PH UR STRIP: 8 [PH] (ref 5–8)
PLATELET # BLD AUTO: 227 K/UL (ref 150–450)
PMV BLD AUTO: 10.8 FL (ref 9.2–12.9)
POTASSIUM SERPL-SCNC: 3.6 MMOL/L (ref 3.5–5.1)
PROT SERPL-MCNC: 7.3 G/DL (ref 6–8.4)
PROT UR QL STRIP: ABNORMAL
RBC # BLD AUTO: 4.76 M/UL (ref 4–5.4)
SODIUM SERPL-SCNC: 142 MMOL/L (ref 136–145)
SP GR UR STRIP: 1.01 (ref 1–1.03)
URN SPEC COLLECT METH UR: ABNORMAL
UROBILINOGEN UR STRIP-ACNC: NEGATIVE EU/DL
WBC # BLD AUTO: 5.65 K/UL (ref 3.9–12.7)

## 2025-03-03 PROCEDURE — 25500020 PHARM REV CODE 255: Performed by: STUDENT IN AN ORGANIZED HEALTH CARE EDUCATION/TRAINING PROGRAM

## 2025-03-03 PROCEDURE — 82272 OCCULT BLD FECES 1-3 TESTS: CPT | Performed by: NURSE PRACTITIONER

## 2025-03-03 PROCEDURE — 80053 COMPREHEN METABOLIC PANEL: CPT | Performed by: NURSE PRACTITIONER

## 2025-03-03 PROCEDURE — 83690 ASSAY OF LIPASE: CPT | Performed by: NURSE PRACTITIONER

## 2025-03-03 PROCEDURE — 36415 COLL VENOUS BLD VENIPUNCTURE: CPT | Performed by: NURSE PRACTITIONER

## 2025-03-03 PROCEDURE — 81003 URINALYSIS AUTO W/O SCOPE: CPT | Performed by: NURSE PRACTITIONER

## 2025-03-03 PROCEDURE — 96374 THER/PROPH/DIAG INJ IV PUSH: CPT

## 2025-03-03 PROCEDURE — 99285 EMERGENCY DEPT VISIT HI MDM: CPT | Mod: 25

## 2025-03-03 PROCEDURE — 85025 COMPLETE CBC W/AUTO DIFF WBC: CPT | Performed by: NURSE PRACTITIONER

## 2025-03-03 PROCEDURE — 63600175 PHARM REV CODE 636 W HCPCS: Performed by: NURSE PRACTITIONER

## 2025-03-03 RX ORDER — MORPHINE SULFATE 2 MG/ML
4 INJECTION, SOLUTION INTRAMUSCULAR; INTRAVENOUS
Refills: 0 | Status: COMPLETED | OUTPATIENT
Start: 2025-03-03 | End: 2025-03-03

## 2025-03-03 RX ORDER — DICYCLOMINE HYDROCHLORIDE 20 MG/1
20 TABLET ORAL 2 TIMES DAILY
Qty: 20 TABLET | Refills: 0 | Status: SHIPPED | OUTPATIENT
Start: 2025-03-03 | End: 2025-04-02

## 2025-03-03 RX ADMIN — MORPHINE SULFATE 4 MG: 2 INJECTION, SOLUTION INTRAMUSCULAR; INTRAVENOUS at 03:03

## 2025-03-03 RX ADMIN — IOHEXOL 75 ML: 350 INJECTION, SOLUTION INTRAVENOUS at 02:03

## 2025-03-03 NOTE — ED PROVIDER NOTES
Encounter Date: 3/3/2025       History     Chief Complaint   Patient presents with    Abdominal Pain     Pt arrived to ED c/o upper abdominal pain, black stool with mucus, dizziness, constipation, and nausea that started today. Pt reports she had 4 bowel movements today.      Chief complaint:  Abdominal pain   [age] year female with a history of anxiety breast cancer depression neuropathy presents to be evaluated for upper abdominal pain and dark stools that she has had since this morning.  She reports that she had for dark tarry stools with mucus today.  Reports that she initially felt constipated.  Reports generalized upper abdominal pain.  Reports he has had this before and has been seen by GI and had an upper and lower GI series with no findings.  She denies the use iron supplements NSAIDs or Pepto-Bismol.  Denies any suspicious foods or recent ill contacts.  She reports she recently started taking Mounjaro 3 weeks ago      Review of patient's allergies indicates:   Allergen Reactions    Prednisone Hallucinations    Sulfa (sulfonamide antibiotics) Hives    Bactrim [sulfamethoxazole-trimethoprim] Rash     Past Medical History:   Diagnosis Date    Abnormal Pap smear of cervix     ASCUS +HPV    Anxiety     Breast CA 02/2017    stage 3    Depression     Depression     Encounter for prophylactic removal of fallopian tube     Estrogen receptor negative status (ER-) 04/15/2024    Hx of psychiatric care     Left breast mass     Malignant neoplasm of upper-outer quadrant of left breast in female, estrogen receptor negative 07/07/2023    dx 2/2017    Neuropathy     Non-healing skin lesion     left lateral chest wall lesion    Panic disorder     Psychiatric problem     Pulmonary embolism     Sleep difficulties      Past Surgical History:   Procedure Laterality Date       Mediport insertion 3/14/17        modified radical mastectomy, left and Port Removal 09/13/2017       expander replaced        left breast and  axillary LN core biopsy        Left lateral chest wall excision of skin lesion 19      BREAST BIOPSY      BREAST RECONSTRUCTION      BREAST SURGERY       SECTION      x2    ESOPHAGOGASTRODUODENOSCOPY N/A 10/18/2024    Procedure: EGD (ESOPHAGOGASTRODUODENOSCOPY);  Surgeon: Yovani Pa MD;  Location: [facility];  Service: Endoscopy;  Laterality: N/A;    fallopian tube removal      infusaport      Left breaset reconstruction Revision with Fat grafting 2021      MASTECTOMY Left      Family History   Problem Relation Name Age of Onset    Anxiety disorder Mother      Breast cancer Neg Hx      Colon cancer Neg Hx      Ovarian cancer Neg Hx       Social History[1]  Review of Systems   Constitutional:  Negative for fatigue and fever.   Gastrointestinal:  Positive for abdominal pain, blood in stool and constipation. Negative for diarrhea, nausea and vomiting.   Genitourinary:  Negative for dysuria and pelvic pain.   Neurological:  Negative for weakness.       Physical Exam     Initial Vitals [25 1350]   BP Pulse Resp Temp SpO2   123/84 76 18 98.2 °F (36.8 °C) 97 %      MAP       --         Physical Exam    Nursing note and vitals reviewed.  Constitutional: She appears well-developed and well-nourished.   HENT:   Head: Normocephalic and atraumatic.   Eyes: EOM are normal. Pupils are equal, round, and reactive to light.   Cardiovascular:  Normal rate and regular rhythm.           Pulmonary/Chest: Breath sounds normal.   Abdominal: Abdomen is soft. Bowel sounds are normal. She exhibits no mass. There is abdominal tenderness.   Generalized upper abdominal pain There is no rebound and no guarding.   Genitourinary:    Genitourinary Comments: No melena or hematochezia on PAUL       Skin: Skin is warm and dry.   Psychiatric: She has a normal mood and affect. Thought content normal.         ED Course   Procedures  Labs Reviewed   URINALYSIS, REFLEX TO URINE CULTURE - Abnormal       Result Value     Specimen UA Urine, Clean Catch      Color, UA Yellow      Appearance, UA Clear      pH, UA 8.0      Specific Gravity, UA 1.015      Protein, UA Trace (*)     Glucose, UA Negative      Ketones, UA Negative      Bilirubin (UA) Negative      Occult Blood UA Negative      Nitrite, UA Negative      Urobilinogen, UA Negative      Leukocytes, UA Negative      Narrative:     Specimen Source->Urine   CBC W/ AUTO DIFFERENTIAL    WBC 5.65      RBC 4.76      Hemoglobin 13.9      Hematocrit 40.9      MCV 86      MCH 29.2      MCHC 34.0      RDW 13.2      Platelets 227      MPV 10.8      Immature Granulocytes 0.2      Gran # (ANC) 2.4      Immature Grans (Abs) 0.01      Lymph # 2.4      Mono # 0.5      Eos # 0.2      Baso # 0.06      nRBC 0      Gran % 43.0      Lymph % 42.5      Mono % 9.0      Eosinophil % 4.2      Basophil % 1.1      Differential Method Automated     COMPREHENSIVE METABOLIC PANEL    Sodium 142      Potassium 3.6      Chloride 106      CO2 27      Glucose 95      BUN 12      Creatinine 0.7      Calcium 8.9      Total Protein 7.3      Albumin 4.2      Total Bilirubin 0.8      Alkaline Phosphatase 87      AST 24      ALT 26      eGFR >60      Anion Gap 9     LIPASE    Lipase 29     OCCULT BLOOD X 1, STOOL    Occult Blood Negative            Imaging Results              CT Abdomen Pelvis With IV Contrast NO Oral Contrast (Final result)  Result time 03/03/25 15:04:03      Final result by Berenice Chilel MD (03/03/25 15:04:03)                   Impression:      No acute abdominal findings.      Electronically signed by: Berenice Chilel MD  Date:    03/03/2025  Time:    15:04               Narrative:    EXAMINATION:  CT ABDOMEN PELVIS WITH IV CONTRAST    CLINICAL HISTORY:  Epigastric pain;    TECHNIQUE:  Low dose axial images, sagittal and coronal reformations were obtained from the lung bases to the pubic symphysis following the IV administration of 75 mL of Omnipaque 350 no p.o.  contrast    COMPARISON:  10/13/2024    FINDINGS:  Visualized lung bases are essentially clear    Liver, spleen, adrenal glands, pancreas unremarkable appearance.  No calcified stones the gallbladder or CT findings of acute cholecystitis and no biliary duct dilatation.  The abdominal aorta tapers without aneurysmal dilatation    Normal appearance of the appendix.  Trace barely perceptible free fluid the pelvis.  No free intraperitoneal air.  No abscess.    Symmetrical renal enhancement.  No hydronephrosis.  Urinary bladder decompressed at the time of the exam.    IUD within the uterus.  Adnexal region unremarkable appearance                                       Medications   iohexoL (OMNIPAQUE 350) injection 75 mL (75 mLs Intravenous Given 3/3/25 1453)   morphine injection 4 mg (4 mg Intravenous Given 3/3/25 1534)     Medical Decision Making  49-year-old female presents to be evaluated for generalized abdominal pain that began today.  Also reports her stools have very dark tarry with some mucus   The diagnoses include upper GI bleed, abdominal pain, gastritis, gastroenteritis    Amount and/or Complexity of Data Reviewed  Labs: ordered.  Radiology: ordered.    Risk  Prescription drug management.  Risk Details: Patient with soft abdomen no signs of acute abdomen   Afebrile   No leukocytosis or gross electrolyte derangement  CT of the abdomen unremarkable   Instructed that patient could possibly be experiencing side effects the Mounjaro she was instructed to take Bentyl as directed and to follow-up with her GI doctor   Stable for DC at this time                                      Clinical Impression:  Final diagnoses:  [R10.13] Epigastric pain (Primary)          ED Disposition Condition    Discharge Stable          ED Prescriptions       Medication Sig Dispense Start Date End Date Auth. Provider    dicyclomine (BENTYL) 20 mg tablet Take 1 tablet (20 mg total) by mouth 2 (two) times daily. 20 tablet 3/3/2025 4/2/2025  Ignacia Novak NP          Follow-up Information    None              [1]   Social History  Tobacco Use    Smoking status: Never    Smokeless tobacco: Never   Substance Use Topics    Alcohol use: No    Drug use: No        Ignacia Novak NP  03/03/25 1555

## 2025-03-03 NOTE — ED TRIAGE NOTES
Pt arrived to ED c/o upper abdominal pain, black stool with mucus, dizziness, constipation, and nausea that started today. Pt reports she had 4 bowel movements today.